# Patient Record
Sex: MALE | Race: WHITE | NOT HISPANIC OR LATINO | Employment: FULL TIME | URBAN - METROPOLITAN AREA
[De-identification: names, ages, dates, MRNs, and addresses within clinical notes are randomized per-mention and may not be internally consistent; named-entity substitution may affect disease eponyms.]

---

## 2017-12-05 ENCOUNTER — APPOINTMENT (OUTPATIENT)
Dept: GENERAL RADIOLOGY | Facility: HOSPITAL | Age: 42
End: 2017-12-05

## 2017-12-05 ENCOUNTER — HOSPITAL ENCOUNTER (EMERGENCY)
Facility: HOSPITAL | Age: 42
Discharge: HOME | End: 2017-12-05
Attending: EMERGENCY MEDICINE | Admitting: EMERGENCY MEDICINE

## 2017-12-05 VITALS
HEIGHT: 70 IN | TEMPERATURE: 98.2 F | DIASTOLIC BLOOD PRESSURE: 98 MMHG | RESPIRATION RATE: 20 BRPM | WEIGHT: 315 LBS | HEART RATE: 82 BPM | BODY MASS INDEX: 45.1 KG/M2 | SYSTOLIC BLOOD PRESSURE: 174 MMHG | OXYGEN SATURATION: 95 %

## 2017-12-05 DIAGNOSIS — M70.22 OLECRANON BURSITIS OF LEFT ELBOW: Primary | ICD-10-CM

## 2017-12-05 PROCEDURE — 73080 X-RAY EXAM OF ELBOW: CPT

## 2017-12-05 PROCEDURE — 99282 EMERGENCY DEPT VISIT SF MDM: CPT | Performed by: EMERGENCY MEDICINE

## 2017-12-05 PROCEDURE — 99283 EMERGENCY DEPT VISIT LOW MDM: CPT

## 2017-12-05 RX ORDER — CEPHALEXIN 500 MG/1
500 CAPSULE ORAL 4 TIMES DAILY
Qty: 40 CAPSULE | Refills: 0 | Status: SHIPPED | OUTPATIENT
Start: 2017-12-05 | End: 2017-12-15

## 2017-12-05 RX ORDER — DICLOFENAC SODIUM 75 MG/1
75 TABLET, DELAYED RELEASE ORAL 2 TIMES DAILY PRN
Qty: 20 TABLET | Refills: 0 | Status: SHIPPED | OUTPATIENT
Start: 2017-12-05 | End: 2019-08-28

## 2017-12-05 RX ORDER — CEPHALEXIN 500 MG/1
500 CAPSULE ORAL ONCE
Status: COMPLETED | OUTPATIENT
Start: 2017-12-05 | End: 2017-12-05

## 2017-12-05 RX ORDER — IBUPROFEN 400 MG/1
800 TABLET ORAL ONCE
Status: COMPLETED | OUTPATIENT
Start: 2017-12-05 | End: 2017-12-05

## 2017-12-05 RX ADMIN — CEPHALEXIN 500 MG: 500 CAPSULE ORAL at 21:32

## 2017-12-05 RX ADMIN — IBUPROFEN 800 MG: 400 TABLET ORAL at 21:32

## 2017-12-06 NOTE — ED PROVIDER NOTES
Subjective     History provided by:  Patient    History of Present Illness    · Chief complaint: Left elbow pain and swelling    · Location: The pain is primarily on the dorsal aspect of the elbow, but he reports some discomfort in his entire elbow.    · Quality/Severity: Swelling and pain of the left elbow.    · Timing/Onset: The patient woke with these symptoms yesterday morning.    · Modifying Factors: The patient is unable to fully extend his left elbow due to pain.    · Associated symptoms: He denies any swelling or pain in his left arm or wrist or left shoulder.    · Narrative: The patient is a 6-year-old white male who awoke yesterday morning with swelling and pain in his left elbow.  He denies any history of trauma.  He is unable to fully extend his left elbow due to pain.  His past medical history significant for diabetes and hypertension.  Social history the patient owns a auto parts store, doesn't smoke or use alcohol.    ED Triage Vitals   Temp Heart Rate Resp BP SpO2   12/05/17 2022 12/05/17 2022 12/05/17 2022 12/05/17 2022 12/05/17 2022   98.2 °F (36.8 °C) 82 20 174/98 95 %      Temp src Heart Rate Source Patient Position BP Location FiO2 (%)   12/05/17 2022 12/05/17 2022 12/05/17 2022 12/05/17 2022 --   Oral Monitor Lying Left arm        Review of Systems   Constitutional: Negative for activity change, chills and fever.   HENT: Negative for trouble swallowing and voice change.    Eyes: Negative for visual disturbance.   Respiratory: Negative for cough and shortness of breath.    Cardiovascular: Negative for chest pain.   Musculoskeletal: Positive for joint swelling (left elbow only). Negative for back pain, myalgias, neck pain and neck stiffness.   Skin: Negative for color change and rash.   Neurological: Negative for weakness, numbness and headaches.   Hematological: Negative for adenopathy. Does not bruise/bleed easily.   Psychiatric/Behavioral: Negative for sleep disturbance.       Past Medical  History:   Diagnosis Date   • Diabetes mellitus    • Hyperlipidemia    • Hypertension        Allergies   Allergen Reactions   • Sulfa Antibiotics        History reviewed. No pertinent surgical history.    History reviewed. No pertinent family history.    Social History     Social History   • Marital status:      Spouse name: N/A   • Number of children: N/A   • Years of education: N/A     Social History Main Topics   • Smoking status: Never Smoker   • Smokeless tobacco: None   • Alcohol use No   • Drug use: No   • Sexual activity: Not Asked     Other Topics Concern   • None     Social History Narrative   • None           Objective   Physical Exam   Constitutional: He is oriented to person, place, and time. He appears well-developed and well-nourished.   HENT:   Head: Normocephalic and atraumatic.   Musculoskeletal:   The left elbow has tenderness in a faint amount erythema over the olecranon area of the dorsal elbow consistent with olecranon bursitis.  There is not a lot of swelling in that area.  He is unable to extend his elbow fully due to pain in his olecranon area of his elbow.  He has a mild amount of pain with pronation.  The left forearm, wrist, hand is nontender without swelling in his neurovascular intact.   Neurological: He is alert and oriented to person, place, and time. No cranial nerve deficit.   No focal motor sensory deficit   Skin: Skin is warm and dry. No rash noted. No erythema.   Psychiatric: He has a normal mood and affect. His behavior is normal. Judgment and thought content normal.   Nursing note and vitals reviewed.      Procedures         ED Course  ED Course   Comment By Time   Dino Report [id number] is blank Alexey Johnson MD 12/05 2102   The patient's x-ray has no bony normality use.  On exam the patient is tender has a mild amount erythema in the olecranon area consistent with an olecranon bursitis.  He'll be placed in a sling and prescribed anti-inflammatories.  I instructed to  follow-up with Dr. Sorto, or Dr. Bragg this week.  To cover the possibility of infection he'll also be given Keflex. Alexey Johnson MD 12/05 2125   Left arm was placed in a sling by tech.  Left hand was neurovascularly intact after placement. Alexey Johnson MD 12/05 2213                  MDM  Number of Diagnoses or Management Options  Olecranon bursitis of left elbow: new and requires workup     Amount and/or Complexity of Data Reviewed  Tests in the radiology section of CPT®: ordered and reviewed  Independent visualization of images, tracings, or specimens: yes    Risk of Complications, Morbidity, and/or Mortality  Presenting problems: low  Diagnostic procedures: low  Management options: low    Patient Progress  Patient progress: stable      Final diagnoses:   Olecranon bursitis of left elbow           Labs Reviewed - No data to display  XR Elbow 3+ View Left   ED Interpretation   No bony abnormality.  Mild soft tissue swelling.  No tissue sinus   consistent with an effusion.             Medication List      New Prescriptions          cephalexin 500 MG capsule   Commonly known as:  KEFLEX   Take 1 capsule by mouth 4 (Four) Times a Day for 10 days.       diclofenac 75 MG EC tablet   Commonly known as:  VOLTAREN   Take 1 tablet by mouth 2 (Two) Times a Day As Needed (Pain) for up to 20   doses.                Alexey Johnson MD  12/05/17 1401

## 2019-08-28 ENCOUNTER — OFFICE VISIT (OUTPATIENT)
Dept: SURGERY | Facility: CLINIC | Age: 44
End: 2019-08-28

## 2019-08-28 VITALS
WEIGHT: 315 LBS | DIASTOLIC BLOOD PRESSURE: 90 MMHG | HEART RATE: 80 BPM | SYSTOLIC BLOOD PRESSURE: 122 MMHG | BODY MASS INDEX: 45.1 KG/M2 | RESPIRATION RATE: 20 BRPM | HEIGHT: 70 IN

## 2019-08-28 DIAGNOSIS — D50.9 IRON DEFICIENCY ANEMIA, UNSPECIFIED IRON DEFICIENCY ANEMIA TYPE: Primary | ICD-10-CM

## 2019-08-28 PROCEDURE — 99203 OFFICE O/P NEW LOW 30 MIN: CPT | Performed by: SURGERY

## 2019-08-28 RX ORDER — GLIPIZIDE 10 MG/1
10 TABLET ORAL
COMMUNITY

## 2019-08-28 RX ORDER — ERGOCALCIFEROL 1.25 MG/1
50000 CAPSULE ORAL WEEKLY
COMMUNITY
End: 2021-03-22

## 2019-08-28 NOTE — PROGRESS NOTES
Haris JUD Hansen 44 y.o. male presents @ the req of [facility] for eval of anemia. Pt reports no previous c-scope or egd.   Chief Complaint   Patient presents with   • ANEMIA             HPI   Above-noted and agree.  This very pleasant 44-year-old male has been diagnosed with iron deficiency anemia.  He has no abdominal pain.  He tolerates a regular diet without nausea or vomiting.  He has self diagnosed him with a colitis problem.  Every so often he will have rectal bleeding for several days.  He also says the area burns like fire.  This will usually resolve after treatment with hemorrhoid cream.  This has not happened for the last several months.  He was originally set up for endoscopy elsewhere but then the procedure was canceled the day of the procedure and that made him upset.  He has no family history of colon cancer.  He has no abdominal pain.  He has no fevers or chills.  He has no other complaints.      Review of Systems   All other systems reviewed and are negative.            Current Outpatient Medications:   •  esomeprazole (nexIUM) 20 MG capsule, Take 20 mg by mouth Every Morning Before Breakfast., Disp: , Rfl:   •  Ferrous Sulfate Dried (FERROUS SULFATE CR PO), Take  by mouth., Disp: , Rfl:   •  glipiZIDE (GLUCOTROL) 10 MG tablet, Take 10 mg by mouth 2 (Two) Times a Day Before Meals., Disp: , Rfl:   •  vitamin D (ERGOCALCIFEROL) 54887 units capsule capsule, Take 50,000 Units by mouth 1 (One) Time Per Week., Disp: , Rfl:   •  FENOFIBRATE PO, Take  by mouth., Disp: , Rfl:   •  Insulin Degludec (TRESIBA FLEXTOUCH SC), Inject  under the skin., Disp: , Rfl:   •  LOSARTAN POTASSIUM PO, Take  by mouth., Disp: , Rfl:   •  metFORMIN (GLUCOPHAGE) 1000 MG tablet, Take 1,000 mg by mouth 2 (Two) Times a Day With Meals., Disp: , Rfl:   •  METOPROLOL SUCCINATE ER PO, Take  by mouth., Disp: , Rfl:   •  SIMVASTATIN PO, Take  by mouth., Disp: , Rfl:         Allergies   Allergen Reactions   • Sulfa Antibiotics   "          Past Medical History:   Diagnosis Date   • Diabetes mellitus (CMS/HCC)    • Hyperlipidemia    • Hypertension    • Sleep apnea            Past Surgical History:   Procedure Laterality Date   • GANGLION CYST EXCISION     • UMBILICAL HERNIA REPAIR             Social History     Tobacco Use   • Smoking status: Never Smoker   • Smokeless tobacco: Never Used   Substance Use Topics   • Alcohol use: No   • Drug use: No             There is no immunization history on file for this patient.        Physical Exam   Constitutional: He is oriented to person, place, and time. He appears well-developed and well-nourished.   HENT:   Head: Normocephalic and atraumatic.   Right Ear: External ear normal.   Left Ear: External ear normal.   Cardiovascular: Normal rate and regular rhythm.   Pulmonary/Chest: Effort normal and breath sounds normal.   Abdominal: Soft. Bowel sounds are normal.   Musculoskeletal: He exhibits no edema or deformity.   Neurological: He is alert and oriented to person, place, and time.   Skin: Skin is warm and dry.   Psychiatric: He has a normal mood and affect. His behavior is normal.   Nursing note and vitals reviewed.      Debilities/Disabilities Identified: None    Emotional Behavior: Appropriate      /90   Pulse 80   Resp 20   Ht 177.8 cm (70\")   Wt (!) 170 kg (374 lb)   BMI 53.66 kg/m²         Haris was seen today for anemia.    Diagnoses and all orders for this visit:    Iron deficiency anemia, unspecified iron deficiency anemia type    We will get Haris scheduled for an EGD and colonoscopy.  I discussed with the patient the benefits and risks of performing endoscopy.  Benefits and risks were not limited to but including bleeding, infection, perforation, complications of anesthesia, aspiration.  The patient appeared to understand and is willing to proceed.    Thank you for allowing me to participate in the care of this interesting patient.            "

## 2019-08-28 NOTE — H&P
Haris JUD Hansen 44 y.o. male presents @ the req of [facility] for eval of anemia. Pt reports no previous c-scope or egd.   Chief Complaint   Patient presents with   • ANEMIA             HPI   Above-noted and agree.  This very pleasant 44-year-old male has been diagnosed with iron deficiency anemia.  He has no abdominal pain.  He tolerates a regular diet without nausea or vomiting.  He has self diagnosed him with a colitis problem.  Every so often he will have rectal bleeding for several days.  He also says the area burns like fire.  This will usually resolve after treatment with hemorrhoid cream.  This has not happened for the last several months.  He was originally set up for endoscopy elsewhere but then the procedure was canceled the day of the procedure and that made him upset.  He has no family history of colon cancer.  He has no abdominal pain.  He has no fevers or chills.  He has no other complaints.      Review of Systems   All other systems reviewed and are negative.            Current Outpatient Medications:   •  esomeprazole (nexIUM) 20 MG capsule, Take 20 mg by mouth Every Morning Before Breakfast., Disp: , Rfl:   •  Ferrous Sulfate Dried (FERROUS SULFATE CR PO), Take  by mouth., Disp: , Rfl:   •  glipiZIDE (GLUCOTROL) 10 MG tablet, Take 10 mg by mouth 2 (Two) Times a Day Before Meals., Disp: , Rfl:   •  vitamin D (ERGOCALCIFEROL) 80005 units capsule capsule, Take 50,000 Units by mouth 1 (One) Time Per Week., Disp: , Rfl:   •  FENOFIBRATE PO, Take  by mouth., Disp: , Rfl:   •  Insulin Degludec (TRESIBA FLEXTOUCH SC), Inject  under the skin., Disp: , Rfl:   •  LOSARTAN POTASSIUM PO, Take  by mouth., Disp: , Rfl:   •  metFORMIN (GLUCOPHAGE) 1000 MG tablet, Take 1,000 mg by mouth 2 (Two) Times a Day With Meals., Disp: , Rfl:   •  METOPROLOL SUCCINATE ER PO, Take  by mouth., Disp: , Rfl:   •  SIMVASTATIN PO, Take  by mouth., Disp: , Rfl:         Allergies   Allergen Reactions   • Sulfa Antibiotics   "          Past Medical History:   Diagnosis Date   • Diabetes mellitus (CMS/HCC)    • Hyperlipidemia    • Hypertension    • Sleep apnea            Past Surgical History:   Procedure Laterality Date   • GANGLION CYST EXCISION     • UMBILICAL HERNIA REPAIR             Social History     Tobacco Use   • Smoking status: Never Smoker   • Smokeless tobacco: Never Used   Substance Use Topics   • Alcohol use: No   • Drug use: No             There is no immunization history on file for this patient.        Physical Exam   Constitutional: He is oriented to person, place, and time. He appears well-developed and well-nourished.   HENT:   Head: Normocephalic and atraumatic.   Right Ear: External ear normal.   Left Ear: External ear normal.   Cardiovascular: Normal rate and regular rhythm.   Pulmonary/Chest: Effort normal and breath sounds normal.   Abdominal: Soft. Bowel sounds are normal.   Musculoskeletal: He exhibits no edema or deformity.   Neurological: He is alert and oriented to person, place, and time.   Skin: Skin is warm and dry.   Psychiatric: He has a normal mood and affect. His behavior is normal.   Nursing note and vitals reviewed.      Debilities/Disabilities Identified: None    Emotional Behavior: Appropriate      /90   Pulse 80   Resp 20   Ht 177.8 cm (70\")   Wt (!) 170 kg (374 lb)   BMI 53.66 kg/m²          Haris was seen today for anemia.    Diagnoses and all orders for this visit:    Iron deficiency anemia, unspecified iron deficiency anemia type    We will get Haris scheduled for an EGD and colonoscopy.  I discussed with the patient the benefits and risks of performing endoscopy.  Benefits and risks were not limited to but including bleeding, infection, perforation, complications of anesthesia, aspiration.  The patient appeared to understand and is willing to proceed.    Thank you for allowing me to participate in the care of this interesting patient.              "

## 2019-09-16 ENCOUNTER — ANESTHESIA EVENT (OUTPATIENT)
Dept: PERIOP | Facility: HOSPITAL | Age: 44
End: 2019-09-16

## 2019-09-17 ENCOUNTER — HOSPITAL ENCOUNTER (OUTPATIENT)
Facility: HOSPITAL | Age: 44
Setting detail: HOSPITAL OUTPATIENT SURGERY
Discharge: HOME | End: 2019-09-17
Attending: SURGERY | Admitting: SURGERY

## 2019-09-17 ENCOUNTER — ANESTHESIA (OUTPATIENT)
Dept: PERIOP | Facility: HOSPITAL | Age: 44
End: 2019-09-17

## 2019-09-17 VITALS
DIASTOLIC BLOOD PRESSURE: 77 MMHG | HEIGHT: 70 IN | WEIGHT: 315 LBS | BODY MASS INDEX: 45.1 KG/M2 | RESPIRATION RATE: 18 BRPM | TEMPERATURE: 98 F | HEART RATE: 68 BPM | OXYGEN SATURATION: 92 % | SYSTOLIC BLOOD PRESSURE: 131 MMHG

## 2019-09-17 DIAGNOSIS — D50.9 IRON DEFICIENCY ANEMIA, UNSPECIFIED IRON DEFICIENCY ANEMIA TYPE: ICD-10-CM

## 2019-09-17 LAB — GLUCOSE BLDC GLUCOMTR-MCNC: 138 MG/DL (ref 70–130)

## 2019-09-17 PROCEDURE — 82962 GLUCOSE BLOOD TEST: CPT

## 2019-09-17 PROCEDURE — 45385 COLONOSCOPY W/LESION REMOVAL: CPT | Performed by: SURGERY

## 2019-09-17 PROCEDURE — 88305 TISSUE EXAM BY PATHOLOGIST: CPT | Performed by: SURGERY

## 2019-09-17 PROCEDURE — 93010 ELECTROCARDIOGRAM REPORT: CPT | Performed by: INTERNAL MEDICINE

## 2019-09-17 PROCEDURE — 93005 ELECTROCARDIOGRAM TRACING: CPT | Performed by: NURSE ANESTHETIST, CERTIFIED REGISTERED

## 2019-09-17 PROCEDURE — 87081 CULTURE SCREEN ONLY: CPT | Performed by: SURGERY

## 2019-09-17 PROCEDURE — 25010000002 PROPOFOL 10 MG/ML EMULSION: Performed by: NURSE ANESTHETIST, CERTIFIED REGISTERED

## 2019-09-17 PROCEDURE — 45380 COLONOSCOPY AND BIOPSY: CPT | Performed by: SURGERY

## 2019-09-17 PROCEDURE — 43239 EGD BIOPSY SINGLE/MULTIPLE: CPT | Performed by: SURGERY

## 2019-09-17 RX ORDER — GLYCOPYRROLATE 0.2 MG/ML
INJECTION INTRAMUSCULAR; INTRAVENOUS AS NEEDED
Status: DISCONTINUED | OUTPATIENT
Start: 2019-09-17 | End: 2019-09-17 | Stop reason: SURG

## 2019-09-17 RX ORDER — SODIUM CHLORIDE, SODIUM LACTATE, POTASSIUM CHLORIDE, CALCIUM CHLORIDE 600; 310; 30; 20 MG/100ML; MG/100ML; MG/100ML; MG/100ML
9 INJECTION, SOLUTION INTRAVENOUS CONTINUOUS PRN
Status: DISCONTINUED | OUTPATIENT
Start: 2019-09-17 | End: 2019-09-17 | Stop reason: HOSPADM

## 2019-09-17 RX ORDER — SODIUM CHLORIDE 0.9 % (FLUSH) 0.9 %
1-10 SYRINGE (ML) INJECTION AS NEEDED
Status: DISCONTINUED | OUTPATIENT
Start: 2019-09-17 | End: 2019-09-17 | Stop reason: HOSPADM

## 2019-09-17 RX ORDER — SODIUM CHLORIDE, SODIUM LACTATE, POTASSIUM CHLORIDE, CALCIUM CHLORIDE 600; 310; 30; 20 MG/100ML; MG/100ML; MG/100ML; MG/100ML
100 INJECTION, SOLUTION INTRAVENOUS CONTINUOUS
Status: CANCELLED | OUTPATIENT
Start: 2019-09-17

## 2019-09-17 RX ORDER — PROPOFOL 10 MG/ML
VIAL (ML) INTRAVENOUS AS NEEDED
Status: DISCONTINUED | OUTPATIENT
Start: 2019-09-17 | End: 2019-09-17 | Stop reason: SURG

## 2019-09-17 RX ORDER — LIDOCAINE HYDROCHLORIDE 20 MG/ML
INJECTION, SOLUTION INFILTRATION; PERINEURAL AS NEEDED
Status: DISCONTINUED | OUTPATIENT
Start: 2019-09-17 | End: 2019-09-17 | Stop reason: SURG

## 2019-09-17 RX ORDER — MAGNESIUM HYDROXIDE 1200 MG/15ML
LIQUID ORAL AS NEEDED
Status: DISCONTINUED | OUTPATIENT
Start: 2019-09-17 | End: 2019-09-17 | Stop reason: HOSPADM

## 2019-09-17 RX ORDER — SODIUM CHLORIDE 0.9 % (FLUSH) 0.9 %
3 SYRINGE (ML) INJECTION EVERY 12 HOURS SCHEDULED
Status: DISCONTINUED | OUTPATIENT
Start: 2019-09-17 | End: 2019-09-17 | Stop reason: HOSPADM

## 2019-09-17 RX ORDER — LIDOCAINE HYDROCHLORIDE 10 MG/ML
0.5 INJECTION, SOLUTION EPIDURAL; INFILTRATION; INTRACAUDAL; PERINEURAL ONCE AS NEEDED
Status: COMPLETED | OUTPATIENT
Start: 2019-09-17 | End: 2019-09-17

## 2019-09-17 RX ORDER — ONDANSETRON 2 MG/ML
4 INJECTION INTRAMUSCULAR; INTRAVENOUS ONCE AS NEEDED
Status: CANCELLED | OUTPATIENT
Start: 2019-09-17

## 2019-09-17 RX ORDER — SODIUM CHLORIDE 9 MG/ML
40 INJECTION, SOLUTION INTRAVENOUS AS NEEDED
Status: DISCONTINUED | OUTPATIENT
Start: 2019-09-17 | End: 2019-09-17 | Stop reason: HOSPADM

## 2019-09-17 RX ADMIN — LIDOCAINE HYDROCHLORIDE 0.2 ML: 10 INJECTION, SOLUTION EPIDURAL; INFILTRATION; INTRACAUDAL; PERINEURAL at 09:45

## 2019-09-17 RX ADMIN — PROPOFOL 30 MG: 10 INJECTION, EMULSION INTRAVENOUS at 11:14

## 2019-09-17 RX ADMIN — PROPOFOL 50 MG: 10 INJECTION, EMULSION INTRAVENOUS at 10:56

## 2019-09-17 RX ADMIN — PROPOFOL 50 MG: 10 INJECTION, EMULSION INTRAVENOUS at 10:39

## 2019-09-17 RX ADMIN — SODIUM CHLORIDE, POTASSIUM CHLORIDE, SODIUM LACTATE AND CALCIUM CHLORIDE 9 ML/HR: 600; 310; 30; 20 INJECTION, SOLUTION INTRAVENOUS at 09:45

## 2019-09-17 RX ADMIN — PROPOFOL 40 MG: 10 INJECTION, EMULSION INTRAVENOUS at 10:32

## 2019-09-17 RX ADMIN — LIDOCAINE HYDROCHLORIDE 100 MG: 20 INJECTION, SOLUTION INFILTRATION; PERINEURAL at 10:31

## 2019-09-17 RX ADMIN — PROPOFOL 120 MG: 10 INJECTION, EMULSION INTRAVENOUS at 10:31

## 2019-09-17 RX ADMIN — PROPOFOL 50 MG: 10 INJECTION, EMULSION INTRAVENOUS at 11:11

## 2019-09-17 RX ADMIN — PROPOFOL 30 MG: 10 INJECTION, EMULSION INTRAVENOUS at 11:23

## 2019-09-17 RX ADMIN — PROPOFOL 50 MG: 10 INJECTION, EMULSION INTRAVENOUS at 11:19

## 2019-09-17 RX ADMIN — PROPOFOL 30 MG: 10 INJECTION, EMULSION INTRAVENOUS at 11:27

## 2019-09-17 RX ADMIN — PROPOFOL 30 MG: 10 INJECTION, EMULSION INTRAVENOUS at 11:00

## 2019-09-17 RX ADMIN — PROPOFOL 50 MG: 10 INJECTION, EMULSION INTRAVENOUS at 11:03

## 2019-09-17 RX ADMIN — PROPOFOL 60 MG: 10 INJECTION, EMULSION INTRAVENOUS at 10:34

## 2019-09-17 RX ADMIN — PROPOFOL 40 MG: 10 INJECTION, EMULSION INTRAVENOUS at 10:35

## 2019-09-17 RX ADMIN — GLYCOPYRROLATE 0.15 MG: 0.2 INJECTION INTRAMUSCULAR; INTRAVENOUS at 10:15

## 2019-09-17 RX ADMIN — PROPOFOL 50 MG: 10 INJECTION, EMULSION INTRAVENOUS at 10:48

## 2019-09-17 RX ADMIN — PROPOFOL 30 MG: 10 INJECTION, EMULSION INTRAVENOUS at 11:07

## 2019-09-17 NOTE — INTERVAL H&P NOTE
"  H&P reviewed. The patient was examined and there are no changes to the H&P.       BP (!) 201/125 (BP Location: Left arm, Patient Position: Lying)   Pulse 79   Temp 98.3 °F (36.8 °C) (Oral)   Resp 20   Ht 177.8 cm (70\")   Wt (!) 169 kg (371 lb 9.6 oz)   SpO2 92%   BMI 53.32 kg/m²         "

## 2019-09-17 NOTE — ANESTHESIA POSTPROCEDURE EVALUATION
Patient: Haris Hansen    Procedure Summary     Date:  09/17/19 Room / Location:  [facility] ENDOSCOPY 1 /  [facility]    Anesthesia Start:  1026 Anesthesia Stop:  1139    Procedures:       Esophagogastroduodenoscoy, biopsy  ,polypectomy (N/A Esophagus)      Colonoscopy,polypectomy (N/A ) Diagnosis:       Iron deficiency anemia, unspecified iron deficiency anemia type      (Iron deficiency anemia, unspecified iron deficiency anemia type [D50.9])    Surgeon:  Deborah Gamez DO Provider:  Judy Henriquez CRNA    Anesthesia Type:  MAC ASA Status:  3          Anesthesia Type: MAC  Last vitals  BP   125/82 (09/17/19 1147)   Temp   98 °F (36.7 °C) (09/17/19 1147)   Pulse   80 (09/17/19 1147)   Resp   18 (09/17/19 1147)     SpO2   94 % (09/17/19 1147)     Post Anesthesia Care and Evaluation    Patient location during evaluation: PHASE II  Patient participation: complete - patient participated  Level of consciousness: awake and alert  Pain score: 0  Pain management: adequate  Airway patency: patent  Anesthetic complications: No anesthetic complications  PONV Status: none  Cardiovascular status: acceptable  Respiratory status: acceptable  Hydration status: acceptable

## 2019-09-17 NOTE — ANESTHESIA PREPROCEDURE EVALUATION
Anesthesia Evaluation     Patient summary reviewed and Nursing notes reviewed   no history of anesthetic complications:  NPO Solid Status: > 8 hours  NPO Liquid Status: > 8 hours           Airway   Mallampati: II  TM distance: >3 FB  Possible difficult intubation and Large neck circumference  Comment: Some ROM issues in past, has good ROM today  Dental      Pulmonary - normal exam    breath sounds clear to auscultation  (+) sleep apnea (noncompliant w/ cpap),   Cardiovascular - normal exam  Exercise tolerance: good (4-7 METS)    Patient on routine beta blocker  Rhythm: regular  Rate: normal    (+) hypertension, hyperlipidemia,     ROS comment: Pt last took BP meds 9/16/19  Currently 205/114    Neuro/Psych  GI/Hepatic/Renal/Endo    (+) morbid obesity, GERD (off meds currently),  diabetes mellitus (accu check 138) type 2 using insulin,     Musculoskeletal     (+) arthralgias, neck pain, neck stiffness,       ROS comment: Hx of C4 - 5  fx (1996) limited ROM  Abdominal   (+) obese,    Substance History - negative use     OB/GYN          Other - negative ROS                       Anesthesia Plan    ASA 3     MAC     Anesthetic plan, all risks, benefits, and alternatives have been provided, discussed and informed consent has been obtained with: patient.  Use of blood products discussed with patient  Consented to blood products.

## 2019-09-17 NOTE — OP NOTE
EGD and Colonoscopy Procedure Note      Pre-operative Diagnosis: Iron deficiency anemia    Post-operative Diagnosis: Gastric polyps, polyps of the sigmoid colon and rectum    Procedure:  EGD with biopsies with cold forceps and  Colonoscopy polypectomy with hot snare and cold forceps    Surgeon: Vera    Anesthetic: MAC     Estimated Blood Loss: Minimal    Complications: None    Indications: Iron deficiency anemia    Findings/Treatments:   Gastric polyps-biopsied with cold forceps and CLOtest with cold forceps  Sigmoid colon polyps-removed with cold forceps  Rectal polyps-removed with hot snare and cold forceps       Scope Withdrawal Time:  > 6 minutes      Recommendations: Await pathology      Procedure Details     After discussing the benefits and risks of an EGD and Colonoscopy, benefits and risks not limited to but including:  Bleeding, infection, perforation, aspiration; informed consent was signed.  The patient was taken into the endoscopy suite at [facility] and placed in the left lateral decubitus position.  MAC anesthesia was induced under appropriate monitoring.  Bite block was placed and the gastroscope was inserted thru such and advanced under direct vision to second portion of the duodenum.  A careful inspection was made as the gastroscope was withdrawn, including a retroflexed view of the proximal stomach; findings and interventions are described below.  If biopsies were taken, this was done with the cold biopsy forceps.    The bed was then rotated 180 degrees.  A rectal exam was performed.  Sphincter tone was normal.  The colonoscope was then inserted and carefully advanced to the cecum while visualizing the mucosa.  The cecum was identified by the ileocecal valve and the orifice of the appendix.  Once in the cecum the scope was slowly withdrawn while carefully evaluated mucosa deflating air.  There were 2 polyps noted in the sigmoid colon that removed cold forceps.  There  were 5 polyps in the rectum.  One was removed with hot snare the remaining removed cold forceps.  Once all the polyps removed the scope was withdrawn and Haris was taken to recovery area in stable postoperative condition having tolerated procedure well.    Deborah Gamez DO

## 2019-09-18 LAB
CYTO UR: NORMAL
LAB AP CASE REPORT: NORMAL
PATH REPORT.FINAL DX SPEC: NORMAL
PATH REPORT.GROSS SPEC: NORMAL
UREASE TISS QL: NEGATIVE

## 2019-09-25 ENCOUNTER — OFFICE VISIT (OUTPATIENT)
Dept: SURGERY | Facility: CLINIC | Age: 44
End: 2019-09-25

## 2019-09-25 DIAGNOSIS — K63.5 POLYP OF COLON, UNSPECIFIED PART OF COLON, UNSPECIFIED TYPE: Primary | ICD-10-CM

## 2019-09-25 PROCEDURE — 99213 OFFICE O/P EST LOW 20 MIN: CPT | Performed by: SURGERY

## 2019-09-25 NOTE — PROGRESS NOTES
Haris Domínguezrip 44 y.o. male presents for PO FU EGD/C-SCOPE.  Pt feels well.        HPI     Haris had gastric polyps and polyps of the colon and rectum.  He said he feels much better.  His symptoms have resolved.  He does not use tobacco.  He has no pain or complaints.    Review of Systems        Past Medical History:   Diagnosis Date   • Diabetes mellitus (CMS/HCC)    • Hyperlipidemia    • Hypertension    • Sleep apnea            Past Surgical History:   Procedure Laterality Date   • COLONOSCOPY N/A 9/17/2019    Procedure: Colonoscopy,polypectomy;  Surgeon: Deborah Gamez DO;  Location: [facility] OR;  Service: Gastroenterology   • ENDOSCOPY N/A 9/17/2019    Procedure: Esophagogastroduodenoscoy, biopsy  ,polypectomy;  Surgeon: Deborah Gamez DO;  Location: [facility] OR;  Service: Gastroenterology   • GANGLION CYST EXCISION     • UMBILICAL HERNIA REPAIR             Physical Exam   Constitutional: He is oriented to person, place, and time. He appears well-developed and well-nourished.   HENT:   Head: Normocephalic and atraumatic.   Right Ear: External ear normal.   Left Ear: External ear normal.   Musculoskeletal: He exhibits no edema or deformity.   Neurological: He is alert and oriented to person, place, and time.   Skin: Skin is warm and dry.   Psychiatric: He has a normal mood and affect. His behavior is normal.           There were no vitals taken for this visit.        Haris was seen today for post-op follow-up.    Diagnoses and all orders for this visit:    Polyp of colon, unspecified part of colon, unspecified type    Haris will need a three year repeat colonoscopy.  We discussed dietary changes to help decrease his colon cancer risks.    Thank you for allowing me to participate in the care of this interesting patient.

## 2019-11-10 ENCOUNTER — APPOINTMENT (OUTPATIENT)
Dept: GENERAL RADIOLOGY | Facility: HOSPITAL | Age: 44
End: 2019-11-10

## 2019-11-10 ENCOUNTER — HOSPITAL ENCOUNTER (OUTPATIENT)
Facility: HOSPITAL | Age: 44
Setting detail: OBSERVATION
Discharge: HOME | End: 2019-11-11
Attending: EMERGENCY MEDICINE | Admitting: HOSPITALIST

## 2019-11-10 DIAGNOSIS — R07.9 CHEST PAIN WITH HIGH RISK FOR CARDIAC ETIOLOGY: Primary | ICD-10-CM

## 2019-11-10 LAB
ALBUMIN SERPL-MCNC: 3.8 G/DL (ref 3.5–5.2)
ALBUMIN/GLOB SERPL: 1.3 G/DL
ALP SERPL-CCNC: 78 U/L (ref 39–117)
ALT SERPL W P-5'-P-CCNC: 26 U/L (ref 1–41)
ANION GAP SERPL CALCULATED.3IONS-SCNC: 12.9 MMOL/L (ref 5–15)
AST SERPL-CCNC: 17 U/L (ref 1–40)
BASOPHILS # BLD AUTO: 0.06 10*3/MM3 (ref 0–0.2)
BASOPHILS NFR BLD AUTO: 0.6 % (ref 0–1.5)
BILIRUB SERPL-MCNC: 0.2 MG/DL (ref 0.2–1.2)
BUN BLD-MCNC: 14 MG/DL (ref 6–20)
BUN/CREAT SERPL: 18.2 (ref 7–25)
CALCIUM SPEC-SCNC: 9 MG/DL (ref 8.6–10.5)
CHLORIDE SERPL-SCNC: 103 MMOL/L (ref 98–107)
CO2 SERPL-SCNC: 22.1 MMOL/L (ref 22–29)
CREAT BLD-MCNC: 0.77 MG/DL (ref 0.76–1.27)
D DIMER PPP FEU-MCNC: <0.27 MCGFEU/ML (ref 0–0.46)
DEPRECATED RDW RBC AUTO: 42.7 FL (ref 37–54)
EOSINOPHIL # BLD AUTO: 0.32 10*3/MM3 (ref 0–0.4)
EOSINOPHIL NFR BLD AUTO: 3.1 % (ref 0.3–6.2)
ERYTHROCYTE [DISTWIDTH] IN BLOOD BY AUTOMATED COUNT: 14.6 % (ref 12.3–15.4)
GFR SERPL CREATININE-BSD FRML MDRD: 111 ML/MIN/1.73
GLOBULIN UR ELPH-MCNC: 3 GM/DL
GLUCOSE BLD-MCNC: 174 MG/DL (ref 65–99)
GLUCOSE BLDC GLUCOMTR-MCNC: 120 MG/DL (ref 70–130)
HCT VFR BLD AUTO: 46.1 % (ref 37.5–51)
HGB BLD-MCNC: 14.6 G/DL (ref 13–17.7)
IMM GRANULOCYTES # BLD AUTO: 0.05 10*3/MM3 (ref 0–0.05)
IMM GRANULOCYTES NFR BLD AUTO: 0.5 % (ref 0–0.5)
LYMPHOCYTES # BLD AUTO: 2.5 10*3/MM3 (ref 0.7–3.1)
LYMPHOCYTES NFR BLD AUTO: 24.4 % (ref 19.6–45.3)
MCH RBC QN AUTO: 25.6 PG (ref 26.6–33)
MCHC RBC AUTO-ENTMCNC: 31.7 G/DL (ref 31.5–35.7)
MCV RBC AUTO: 80.9 FL (ref 79–97)
MONOCYTES # BLD AUTO: 0.77 10*3/MM3 (ref 0.1–0.9)
MONOCYTES NFR BLD AUTO: 7.5 % (ref 5–12)
NEUTROPHILS # BLD AUTO: 6.54 10*3/MM3 (ref 1.7–7)
NEUTROPHILS NFR BLD AUTO: 63.9 % (ref 42.7–76)
NRBC BLD AUTO-RTO: 0 /100 WBC (ref 0–0.2)
NT-PROBNP SERPL-MCNC: 14.3 PG/ML (ref 5–450)
PLATELET # BLD AUTO: 275 10*3/MM3 (ref 140–450)
PMV BLD AUTO: 10.4 FL (ref 6–12)
POTASSIUM BLD-SCNC: 4.2 MMOL/L (ref 3.5–5.2)
PROT SERPL-MCNC: 6.8 G/DL (ref 6–8.5)
RBC # BLD AUTO: 5.7 10*6/MM3 (ref 4.14–5.8)
SODIUM BLD-SCNC: 138 MMOL/L (ref 136–145)
TROPONIN T SERPL-MCNC: <0.01 NG/ML (ref 0–0.03)
TROPONIN T SERPL-MCNC: <0.01 NG/ML (ref 0–0.03)
WBC NRBC COR # BLD: 10.24 10*3/MM3 (ref 3.4–10.8)

## 2019-11-10 PROCEDURE — 84484 ASSAY OF TROPONIN QUANT: CPT | Performed by: PHYSICIAN ASSISTANT

## 2019-11-10 PROCEDURE — 80053 COMPREHEN METABOLIC PANEL: CPT | Performed by: PHYSICIAN ASSISTANT

## 2019-11-10 PROCEDURE — 93005 ELECTROCARDIOGRAM TRACING: CPT | Performed by: PHYSICIAN ASSISTANT

## 2019-11-10 PROCEDURE — G0378 HOSPITAL OBSERVATION PER HR: HCPCS

## 2019-11-10 PROCEDURE — 96372 THER/PROPH/DIAG INJ SC/IM: CPT

## 2019-11-10 PROCEDURE — 25010000002 ENOXAPARIN PER 10 MG: Performed by: PHYSICIAN ASSISTANT

## 2019-11-10 PROCEDURE — 99220 PR INITIAL OBSERVATION CARE/DAY 70 MINUTES: CPT | Performed by: HOSPITALIST

## 2019-11-10 PROCEDURE — 0100U HC BIOFIRE FILMARRAY RESP PANEL 2: CPT | Performed by: HOSPITALIST

## 2019-11-10 PROCEDURE — 83880 ASSAY OF NATRIURETIC PEPTIDE: CPT | Performed by: PHYSICIAN ASSISTANT

## 2019-11-10 PROCEDURE — 82962 GLUCOSE BLOOD TEST: CPT

## 2019-11-10 PROCEDURE — 94799 UNLISTED PULMONARY SVC/PX: CPT

## 2019-11-10 PROCEDURE — 93010 ELECTROCARDIOGRAM REPORT: CPT | Performed by: INTERNAL MEDICINE

## 2019-11-10 PROCEDURE — 85379 FIBRIN DEGRADATION QUANT: CPT | Performed by: PHYSICIAN ASSISTANT

## 2019-11-10 PROCEDURE — 93005 ELECTROCARDIOGRAM TRACING: CPT | Performed by: EMERGENCY MEDICINE

## 2019-11-10 PROCEDURE — 71046 X-RAY EXAM CHEST 2 VIEWS: CPT

## 2019-11-10 PROCEDURE — 99284 EMERGENCY DEPT VISIT MOD MDM: CPT | Performed by: PHYSICIAN ASSISTANT

## 2019-11-10 PROCEDURE — 93005 ELECTROCARDIOGRAM TRACING: CPT | Performed by: HOSPITALIST

## 2019-11-10 PROCEDURE — 63710000001 INSULIN DETEMIR PER 5 UNITS: Performed by: HOSPITALIST

## 2019-11-10 PROCEDURE — 83036 HEMOGLOBIN GLYCOSYLATED A1C: CPT | Performed by: HOSPITALIST

## 2019-11-10 PROCEDURE — 84484 ASSAY OF TROPONIN QUANT: CPT | Performed by: HOSPITALIST

## 2019-11-10 PROCEDURE — 85025 COMPLETE CBC W/AUTO DIFF WBC: CPT | Performed by: PHYSICIAN ASSISTANT

## 2019-11-10 PROCEDURE — 99284 EMERGENCY DEPT VISIT MOD MDM: CPT

## 2019-11-10 RX ORDER — METOPROLOL SUCCINATE 50 MG/1
100 TABLET, EXTENDED RELEASE ORAL DAILY
Status: DISCONTINUED | OUTPATIENT
Start: 2019-11-11 | End: 2019-11-11 | Stop reason: HOSPADM

## 2019-11-10 RX ORDER — PANTOPRAZOLE SODIUM 40 MG/1
40 TABLET, DELAYED RELEASE ORAL EVERY MORNING
Status: DISCONTINUED | OUTPATIENT
Start: 2019-11-11 | End: 2019-11-11 | Stop reason: HOSPADM

## 2019-11-10 RX ORDER — LOSARTAN POTASSIUM AND HYDROCHLOROTHIAZIDE 25; 100 MG/1; MG/1
1 TABLET ORAL DAILY
COMMUNITY
End: 2021-03-22

## 2019-11-10 RX ORDER — DEXTROSE MONOHYDRATE 25 G/50ML
25 INJECTION, SOLUTION INTRAVENOUS
Status: DISCONTINUED | OUTPATIENT
Start: 2019-11-10 | End: 2019-11-11 | Stop reason: HOSPADM

## 2019-11-10 RX ORDER — SODIUM CHLORIDE 9 MG/ML
40 INJECTION, SOLUTION INTRAVENOUS AS NEEDED
Status: DISCONTINUED | OUTPATIENT
Start: 2019-11-10 | End: 2019-11-11 | Stop reason: HOSPADM

## 2019-11-10 RX ORDER — SODIUM CHLORIDE 0.9 % (FLUSH) 0.9 %
10 SYRINGE (ML) INJECTION EVERY 12 HOURS SCHEDULED
Status: DISCONTINUED | OUTPATIENT
Start: 2019-11-10 | End: 2019-11-11 | Stop reason: HOSPADM

## 2019-11-10 RX ORDER — ATORVASTATIN CALCIUM 20 MG/1
20 TABLET, FILM COATED ORAL NIGHTLY
Status: DISCONTINUED | OUTPATIENT
Start: 2019-11-10 | End: 2019-11-11

## 2019-11-10 RX ORDER — SODIUM CHLORIDE 0.9 % (FLUSH) 0.9 %
10 SYRINGE (ML) INJECTION AS NEEDED
Status: DISCONTINUED | OUTPATIENT
Start: 2019-11-10 | End: 2019-11-11 | Stop reason: HOSPADM

## 2019-11-10 RX ORDER — NITROGLYCERIN 0.4 MG/1
0.4 TABLET SUBLINGUAL
Status: DISCONTINUED | OUTPATIENT
Start: 2019-11-10 | End: 2019-11-11 | Stop reason: HOSPADM

## 2019-11-10 RX ORDER — ACETAMINOPHEN 325 MG/1
650 TABLET ORAL EVERY 6 HOURS PRN
Status: DISCONTINUED | OUTPATIENT
Start: 2019-11-10 | End: 2019-11-11 | Stop reason: HOSPADM

## 2019-11-10 RX ORDER — NICOTINE POLACRILEX 4 MG
15 LOZENGE BUCCAL
Status: DISCONTINUED | OUTPATIENT
Start: 2019-11-10 | End: 2019-11-11 | Stop reason: HOSPADM

## 2019-11-10 RX ORDER — TRAMADOL HYDROCHLORIDE 50 MG/1
50 TABLET ORAL EVERY 6 HOURS PRN
Status: DISCONTINUED | OUTPATIENT
Start: 2019-11-10 | End: 2019-11-11 | Stop reason: HOSPADM

## 2019-11-10 RX ORDER — ASPIRIN 81 MG/1
324 TABLET, CHEWABLE ORAL ONCE
Status: COMPLETED | OUTPATIENT
Start: 2019-11-10 | End: 2019-11-10

## 2019-11-10 RX ORDER — TRAMADOL HYDROCHLORIDE 50 MG/1
50 TABLET ORAL EVERY 6 HOURS PRN
COMMUNITY

## 2019-11-10 RX ORDER — ASPIRIN 325 MG
325 TABLET, DELAYED RELEASE (ENTERIC COATED) ORAL DAILY
Status: DISCONTINUED | OUTPATIENT
Start: 2019-11-11 | End: 2019-11-11 | Stop reason: HOSPADM

## 2019-11-10 RX ADMIN — SODIUM CHLORIDE, PRESERVATIVE FREE 10 ML: 5 INJECTION INTRAVENOUS at 20:25

## 2019-11-10 RX ADMIN — ENOXAPARIN SODIUM 160 MG: 80 INJECTION SUBCUTANEOUS at 14:49

## 2019-11-10 RX ADMIN — ASPIRIN 81 MG 324 MG: 81 TABLET ORAL at 13:25

## 2019-11-10 RX ADMIN — INSULIN DETEMIR 25 UNITS: 100 INJECTION, SOLUTION SUBCUTANEOUS at 23:45

## 2019-11-10 RX ADMIN — ACETAMINOPHEN 650 MG: 325 TABLET, FILM COATED ORAL at 23:45

## 2019-11-10 NOTE — PLAN OF CARE
Problem: Patient Care Overview  Goal: Plan of Care Review  Outcome: Ongoing (interventions implemented as appropriate)   11/10/19 1633   Coping/Psychosocial   Plan of Care Reviewed With patient   Plan of Care Review   Progress no change   OTHER   Outcome Summary pt admitted for SOA and chest pain- no complaints since admission. troponin negative, chest xray shows no acute findings. will be NPO after midnight.      Goal: Individualization and Mutuality  Outcome: Ongoing (interventions implemented as appropriate)   11/10/19 1633   Individualization   Patient Specific Preferences none voiced     Goal: Discharge Needs Assessment  Outcome: Ongoing (interventions implemented as appropriate)   11/10/19 1506 11/10/19 1508 11/10/19 1633   Discharge Needs Assessment   Readmission Within the Last 30 Days --  --  no previous admission in last 30 days   Patient/Family Anticipates Transition to --  home with family --    Patient/Family Anticipated Services at Transition --  none --    Transportation Anticipated --  car, drives self;family or friend will provide --    Disability   Equipment Currently Used at Home none --  --        Problem: Fall Risk (Adult)  Goal: Identify Related Risk Factors and Signs and Symptoms  Outcome: Ongoing (interventions implemented as appropriate)   11/10/19 1633   Fall Risk (Adult)   Related Risk Factors (Fall Risk) environment unfamiliar     Goal: Absence of Fall  Outcome: Ongoing (interventions implemented as appropriate)   11/10/19 1633   Fall Risk (Adult)   Absence of Fall making progress toward outcome       Problem: Pain, Chronic (Adult)  Goal: Acceptable Pain/Comfort Level and Functional Ability  Outcome: Ongoing (interventions implemented as appropriate)   11/10/19 1633   Pain, Chronic (Adult)   Acceptable Pain/Comfort Level and Functional Ability making progress toward outcome

## 2019-11-10 NOTE — ED PROVIDER NOTES
EMERGENCY DEPARTMENT ENCOUNTER      Room Number: 2/02    History is provided by the patient, no translation services needed    HPI:    Chief complaint: Chest pain    Location: Substernal radiating to back and left jaw    Quality/Severity: Pressure, 5/10    Timing/Duration: Started an hour ago, lasted approximately 30 minutes    Modifying Factors: Pain improved after rest.    Associated Symptoms: Positive for chest pain, shortness of air, diaphoresis, nausea, dizziness, cough.  Patient denies any vomiting or diarrhea or abdominal pain, denies any syncope.    Narrative: Pt is a 44 y.o. male who presents complaining of an episode of chest pain today that lasted for 30 minutes.  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  He states this episode lasted approximately 30 minutes, he continued to try to preach, however he ended up sitting down on a piano bench to catch his breath and states the episode subsided.  He has a PMH significant for HTN, HLD, DM type II.  He states he has never experienced pain like this before.  He had a normal stress test approximately 6 or 7 years ago, he has never had a heart cath, he states his father and grandfather both had MIs in their 40s.  He has not nor has he ever been a smoker.  He states he does not take any aspirin or blood thinners.  Patient also reports he has had a productive cough for the past 3 days, he states he has been coughing up brownish sputum.  Denies any hemoptysis.      PMD: Theron Manzo APRN    REVIEW OF SYSTEMS  Review of Systems   Constitutional: Positive for diaphoresis. Negative for chills and fever.   HENT: Negative for rhinorrhea and sore throat.    Respiratory: Positive for cough and shortness of breath. Negative for wheezing.    Cardiovascular: Positive for chest  pain.   Gastrointestinal: Positive for nausea. Negative for abdominal pain, diarrhea and vomiting.   Genitourinary: Negative for difficulty urinating and dysuria.   Musculoskeletal: Negative for arthralgias and myalgias.   Skin: Negative for color change and pallor.   Neurological: Positive for light-headedness. Negative for syncope and headaches.   Psychiatric/Behavioral: Negative for confusion. The patient is not nervous/anxious.          PAST MEDICAL HISTORY  Active Ambulatory Problems     Diagnosis Date Noted   • Iron deficiency anemia 08/28/2019     Resolved Ambulatory Problems     Diagnosis Date Noted   • No Resolved Ambulatory Problems     Past Medical History:   Diagnosis Date   • Diabetes mellitus (CMS/[facility])    • Fracture of C4 vertebra, open (CMS/[facility])    • GERD (gastroesophageal reflux disease)    • Hyperlipidemia    • Hypertension    • Sleep apnea        PAST SURGICAL HISTORY  Past Surgical History:   Procedure Laterality Date   • COLONOSCOPY N/A 9/17/2019    Procedure: Colonoscopy,polypectomy;  Surgeon: Deborah Gamez DO;  Location: [facility];  Service: Gastroenterology   • ENDOSCOPY N/A 9/17/2019    Procedure: Esophagogastroduodenoscoy, biopsy  ,polypectomy;  Surgeon: Deborah Gamez DO;  Location: [facility];  Service: Gastroenterology   • GANGLION CYST EXCISION     • UMBILICAL HERNIA REPAIR         FAMILY HISTORY  Family History   Problem Relation Age of Onset   • Diabetes Father    • Heart disease Father    • Hypertension Father        SOCIAL HISTORY  Social History     Socioeconomic History   • Marital status:      Spouse name: Not on file   • Number of children: Not on file   • Years of education: Not on file   • Highest education level: Not on file   Tobacco Use   • Smoking status: Never Smoker   • Smokeless tobacco: Never Used   Substance and Sexual Activity   • Alcohol use: No   • Drug use: No   • Sexual activity: Defer       ALLERGIES  Sulfa antibiotics      Current  Facility-Administered Medications:   •  enoxaparin (LOVENOX) syringe 160 mg, 1 mg/kg, Subcutaneous, Once, Paty Toro PA-C  •  [COMPLETED] Insert peripheral IV, , , Once **AND** sodium chloride 0.9 % flush 10 mL, 10 mL, Intravenous, PRN, Paty Toro PA-C    Current Outpatient Medications:   •  Ferrous Sulfate Dried (FERROUS SULFATE CR PO), Take  by mouth., Disp: , Rfl:   •  glipiZIDE (GLUCOTROL) 10 MG tablet, Take 10 mg by mouth 2 (Two) Times a Day Before Meals., Disp: , Rfl:   •  Insulin Degludec (TRESIBA FLEXTOUCH SC), Inject  under the skin., Disp: , Rfl:   •  LOSARTAN POTASSIUM PO, Take  by mouth., Disp: , Rfl:   •  metFORMIN (GLUCOPHAGE) 1000 MG tablet, Take 1,000 mg by mouth 2 (Two) Times a Day With Meals., Disp: , Rfl:   •  METOPROLOL SUCCINATE ER PO, Take  by mouth., Disp: , Rfl:   •  SIMVASTATIN PO, Take  by mouth., Disp: , Rfl:   •  traMADol (ULTRAM) 50 MG tablet, Take 50 mg by mouth Every 6 (Six) Hours As Needed for Moderate Pain ., Disp: , Rfl:   •  vitamin D (ERGOCALCIFEROL) 85948 units capsule capsule, Take 50,000 Units by mouth 1 (One) Time Per Week., Disp: , Rfl:   •  esomeprazole (nexIUM) 20 MG capsule, Take 20 mg by mouth Every Morning Before Breakfast., Disp: , Rfl:   •  FENOFIBRATE PO, Take  by mouth., Disp: , Rfl:     PHYSICAL EXAM  ED Triage Vitals [11/10/19 1302]   Temp Heart Rate Resp BP SpO2   -- 88 20 -- 98 %      Temp src Heart Rate Source Patient Position BP Location FiO2 (%)   -- Monitor Lying -- --       Physical Exam   Constitutional: He is oriented to person, place, and time. No distress.   Morbidly obese   HENT:   Head: Normocephalic and atraumatic.   Mouth/Throat: Oropharynx is clear and moist.   Eyes: Conjunctivae are normal. Pupils are equal, round, and reactive to light.   Neck: Normal range of motion. Neck supple.   Cardiovascular: Normal rate, regular rhythm, normal heart sounds and intact distal pulses.   Pulmonary/Chest: Effort normal and breath sounds  normal. He exhibits no tenderness.   Abdominal: Soft. Bowel sounds are normal. There is no tenderness. There is no guarding.   Musculoskeletal: Normal range of motion. He exhibits no edema.   Neurological: He is alert and oriented to person, place, and time. Gait normal. GCS score is 15.   Skin: Skin is warm and dry. He is not diaphoretic.   Psychiatric: Mood, memory, affect and judgment normal.         LAB RESULTS  Results for orders placed or performed during the hospital encounter of 11/10/19   Comprehensive Metabolic Panel   Result Value Ref Range    Glucose 174 (H) 65 - 99 mg/dL    BUN 14 6 - 20 mg/dL    Creatinine 0.77 0.76 - 1.27 mg/dL    Sodium 138 136 - 145 mmol/L    Potassium 4.2 3.5 - 5.2 mmol/L    Chloride 103 98 - 107 mmol/L    CO2 22.1 22.0 - 29.0 mmol/L    Calcium 9.0 8.6 - 10.5 mg/dL    Total Protein 6.8 6.0 - 8.5 g/dL    Albumin 3.80 3.50 - 5.20 g/dL    ALT (SGPT) 26 1 - 41 U/L    AST (SGOT) 17 1 - 40 U/L    Alkaline Phosphatase 78 39 - 117 U/L    Total Bilirubin 0.2 0.2 - 1.2 mg/dL    eGFR Non African Amer 111 >60 mL/min/1.73    Globulin 3.0 gm/dL    A/G Ratio 1.3 g/dL    BUN/Creatinine Ratio 18.2 7.0 - 25.0    Anion Gap 12.9 5.0 - 15.0 mmol/L   BNP   Result Value Ref Range    proBNP 14.3 5.0 - 450.0 pg/mL   Troponin   Result Value Ref Range    Troponin T <0.010 0.000-<0.030 ng/mL   D-dimer, Quantitative   Result Value Ref Range    D-Dimer, Quantitative <0.27 0.00 - 0.46 MCGFEU/mL   CBC Auto Differential   Result Value Ref Range    WBC 10.24 3.40 - 10.80 10*3/mm3    RBC 5.70 4.14 - 5.80 10*6/mm3    Hemoglobin 14.6 13.0 - 17.7 g/dL    Hematocrit 46.1 37.5 - 51.0 %    MCV 80.9 79.0 - 97.0 fL    MCH 25.6 (L) 26.6 - 33.0 pg    MCHC 31.7 31.5 - 35.7 g/dL    RDW 14.6 12.3 - 15.4 %    RDW-SD 42.7 37.0 - 54.0 fl    MPV 10.4 6.0 - 12.0 fL    Platelets 275 140 - 450 10*3/mm3    Neutrophil % 63.9 42.7 - 76.0 %    Lymphocyte % 24.4 19.6 - 45.3 %    Monocyte % 7.5 5.0 - 12.0 %    Eosinophil % 3.1 0.3 - 6.2 %     Basophil % 0.6 0.0 - 1.5 %    Immature Grans % 0.5 0.0 - 0.5 %    Neutrophils, Absolute 6.54 1.70 - 7.00 10*3/mm3    Lymphocytes, Absolute 2.50 0.70 - 3.10 10*3/mm3    Monocytes, Absolute 0.77 0.10 - 0.90 10*3/mm3    Eosinophils, Absolute 0.32 0.00 - 0.40 10*3/mm3    Basophils, Absolute 0.06 0.00 - 0.20 10*3/mm3    Immature Grans, Absolute 0.05 0.00 - 0.05 10*3/mm3    nRBC 0.0 0.0 - 0.2 /100 WBC         I ordered the above labs and reviewed the results    RADIOLOGY  Xr Chest 2 View    Result Date: 11/10/2019  Narrative: CR Chest 2 Vws INDICATION:  Shortness of air with productive cough. Chest pain for 3 hours. COMPARISON:  None. FINDINGS: PA and lateral views of the chest.  Heart and mediastinal contours are normal. The lungs are clear. No pneumothorax or pleural effusion.      Impression: No acute cardiopulmonary findings. Signer Name: RAJAN Bishop MD  Signed: 11/10/2019 1:35 PM  Workstation Name: [facility]  [facility]      I ordered the above radiologic testing and reviewed the results    PROCEDURES  Procedures      PROGRESS AND CONSULTS  ED Course as of Nov 10 1427   Sun Nov 10, 2019   1300 EKG         EKG time / Interpretation time: 1254/1256  Rhythm/Rate: Sinus rhythm, 84   NY: 152  QRS, axis: 66  QTc 427  ST and T waves: There is no ST elevation or depression, there are T wave inversions in lead III.  EKG Tracing Interpreted Contemporaneously by me, independently viewed by me and MD.  T wave inversions in lead III today are new compared to prior EKG from 9/17/2019.      [KS]   1358 CONSULT  Time 1358  Discussed case with Dr Hart, hospitalist.  Reviewed history, exam, results and treatments.  Discussed concerns and plan of care. Dr Hart requested that we consult cardiology prior to admission to see if they would prefer patient be transferred to [facility].    [KS]   1410 CONSULT  Time 1410  Discussed case with Dr Bruner, cardiology.  Reviewed history, exam, results and  treatments.  Discussed concerns and plan of care. Dr Bruner states she recommends that we admit patient to [facility] for further work-up and treatment.  [KS]   1412 CONSULT  Time 1412  Discussed case with Dr Hart, hospitalist.  Informed her Dr. Bruner advises to admit patient to [facility] for further work-up.  Dr Hart accepts pt to be admitted to [facility] with telemetry for observation.    [KS]   1422 I have discussed lab and imaging findings with patient and his wife.  I discussed that his chest pain today is quite suspicious for cardiac etiology, and we recommend he stay in the hospital for observation and further work-up.  Patient is agreeable with this plan and verbalizes understanding.  He has no further questions at this time.  [KS]      ED Course User Index  [KS] Paty Toro, JOSE           MEDICAL DECISION MAKING  Results were reviewed/discussed with the patient and they were also made aware of online access. Pt also made aware that some labs, such as cultures, will not be resulted during ER visit and follow up with PMD is necessary.     MDM      HEART Score (for prediction of 6-week risk of major adverse cardiac event) reviewed and/or performed as part of the patient evaluation and treatment planning process.  The result associated with this review/performance is: 4    My differential diagnosis for chest pain includes but is not limited to:  Muscle strain, costochondritis, myositis, pleurisy, rib fracture, intercostal neuritis, herpes zoster, tumor, myocardial infarction, coronary syndrome, unstable angina, angina, aortic dissection, mitral valve prolapse, pericarditis, palpitations, pulmonary embolus, pneumonia, pneumothorax, lung cancer, GERD, esophagitis, esophageal spasm      DIAGNOSIS  Final diagnoses:   Chest pain with high risk for cardiac etiology       Latest Documented Vital Signs:  As of 2:27 PM  BP- 137/88 HR- 83 Temp- 98.5 °F  (36.9 °C) (Oral) O2 sat- 99%    DISPOSITION  Patient admitted to [facility].       Medication List      No changes were made to your prescriptions during this visit.                 Dictated utilizing Dragon dictation       Paty Toro PA-C  11/10/19 3299

## 2019-11-11 ENCOUNTER — APPOINTMENT (OUTPATIENT)
Dept: ULTRASOUND IMAGING | Facility: HOSPITAL | Age: 44
End: 2019-11-11

## 2019-11-11 ENCOUNTER — APPOINTMENT (OUTPATIENT)
Dept: CARDIOLOGY | Facility: HOSPITAL | Age: 44
End: 2019-11-11

## 2019-11-11 VITALS
BODY MASS INDEX: 45.1 KG/M2 | TEMPERATURE: 96.7 F | RESPIRATION RATE: 18 BRPM | HEART RATE: 79 BPM | SYSTOLIC BLOOD PRESSURE: 147 MMHG | OXYGEN SATURATION: 93 % | HEIGHT: 70 IN | WEIGHT: 315 LBS | DIASTOLIC BLOOD PRESSURE: 81 MMHG

## 2019-11-11 LAB
ARTICHOKE IGE QN: 220 MG/DL (ref 0–100)
B PARAPERT DNA SPEC QL NAA+PROBE: NOT DETECTED
B PERT DNA SPEC QL NAA+PROBE: NOT DETECTED
BH CV ECHO MEAS - AO MAX PG (FULL): 1.7 MMHG
BH CV ECHO MEAS - AO MAX PG: 6.9 MMHG
BH CV ECHO MEAS - AO MEAN PG (FULL): 1 MMHG
BH CV ECHO MEAS - AO MEAN PG: 4 MMHG
BH CV ECHO MEAS - AO ROOT AREA (BSA CORRECTED): 1.2
BH CV ECHO MEAS - AO ROOT AREA: 8 CM^2
BH CV ECHO MEAS - AO ROOT DIAM: 3.2 CM
BH CV ECHO MEAS - AO V2 MAX: 131 CM/SEC
BH CV ECHO MEAS - AO V2 MEAN: 90.1 CM/SEC
BH CV ECHO MEAS - AO V2 VTI: 29 CM
BH CV ECHO MEAS - ASC AORTA: 2.8 CM
BH CV ECHO MEAS - AVA(I,A): 3.7 CM^2
BH CV ECHO MEAS - AVA(I,D): 3.7 CM^2
BH CV ECHO MEAS - AVA(V,A): 3.9 CM^2
BH CV ECHO MEAS - AVA(V,D): 3.9 CM^2
BH CV ECHO MEAS - BSA(HAYCOCK): 3 M^2
BH CV ECHO MEAS - BSA: 2.7 M^2
BH CV ECHO MEAS - BZI_BMI: 53.4 KILOGRAMS/M^2
BH CV ECHO MEAS - BZI_METRIC_HEIGHT: 177.8 CM
BH CV ECHO MEAS - BZI_METRIC_WEIGHT: 168.7 KG
BH CV ECHO MEAS - EDV(CUBED): 143.1 ML
BH CV ECHO MEAS - EDV(MOD-SP2): 131 ML
BH CV ECHO MEAS - EDV(MOD-SP4): 173 ML
BH CV ECHO MEAS - EDV(TEICH): 131.2 ML
BH CV ECHO MEAS - EF(CUBED): 76.4 %
BH CV ECHO MEAS - EF(MOD-BP): 64 %
BH CV ECHO MEAS - EF(MOD-SP2): 63.9 %
BH CV ECHO MEAS - EF(MOD-SP4): 63.9 %
BH CV ECHO MEAS - EF(TEICH): 68.1 %
BH CV ECHO MEAS - ESV(CUBED): 33.7 ML
BH CV ECHO MEAS - ESV(MOD-SP2): 47.3 ML
BH CV ECHO MEAS - ESV(MOD-SP4): 62.4 ML
BH CV ECHO MEAS - ESV(TEICH): 41.9 ML
BH CV ECHO MEAS - FS: 38.2 %
BH CV ECHO MEAS - IVS/LVPW: 0.99
BH CV ECHO MEAS - IVSD: 1 CM
BH CV ECHO MEAS - LAT PEAK E' VEL: 10 CM/SEC
BH CV ECHO MEAS - LV DIASTOLIC VOL/BSA (35-75): 63.7 ML/M^2
BH CV ECHO MEAS - LV MASS(C)D: 197.3 GRAMS
BH CV ECHO MEAS - LV MASS(C)DI: 72.6 GRAMS/M^2
BH CV ECHO MEAS - LV MAX PG: 5.2 MMHG
BH CV ECHO MEAS - LV MEAN PG: 3 MMHG
BH CV ECHO MEAS - LV SYSTOLIC VOL/BSA (12-30): 23 ML/M^2
BH CV ECHO MEAS - LV V1 MAX: 114 CM/SEC
BH CV ECHO MEAS - LV V1 MEAN: 76.3 CM/SEC
BH CV ECHO MEAS - LV V1 VTI: 24 CM
BH CV ECHO MEAS - LVIDD: 5.2 CM
BH CV ECHO MEAS - LVIDS: 3.2 CM
BH CV ECHO MEAS - LVLD AP2: 9.2 CM
BH CV ECHO MEAS - LVLD AP4: 9.3 CM
BH CV ECHO MEAS - LVLS AP2: 7.5 CM
BH CV ECHO MEAS - LVLS AP4: 7.6 CM
BH CV ECHO MEAS - LVOT AREA (M): 4.5 CM^2
BH CV ECHO MEAS - LVOT AREA: 4.5 CM^2
BH CV ECHO MEAS - LVOT DIAM: 2.4 CM
BH CV ECHO MEAS - LVPWD: 1 CM
BH CV ECHO MEAS - MED PEAK E' VEL: 8.5 CM/SEC
BH CV ECHO MEAS - MV A DUR: 0.11 SEC
BH CV ECHO MEAS - MV A MAX VEL: 74.7 CM/SEC
BH CV ECHO MEAS - MV DEC SLOPE: 351 CM/SEC^2
BH CV ECHO MEAS - MV DEC TIME: 151 SEC
BH CV ECHO MEAS - MV E MAX VEL: 82.9 CM/SEC
BH CV ECHO MEAS - MV E/A: 1.1
BH CV ECHO MEAS - MV MAX PG: 3.7 MMHG
BH CV ECHO MEAS - MV MEAN PG: 2 MMHG
BH CV ECHO MEAS - MV P1/2T MAX VEL: 95.6 CM/SEC
BH CV ECHO MEAS - MV P1/2T: 79.8 MSEC
BH CV ECHO MEAS - MV V2 MAX: 96.6 CM/SEC
BH CV ECHO MEAS - MV V2 MEAN: 58 CM/SEC
BH CV ECHO MEAS - MV V2 VTI: 28.1 CM
BH CV ECHO MEAS - MVA P1/2T LCG: 2.3 CM^2
BH CV ECHO MEAS - MVA(P1/2T): 2.8 CM^2
BH CV ECHO MEAS - MVA(VTI): 3.9 CM^2
BH CV ECHO MEAS - RAP SYSTOLE: 8 MMHG
BH CV ECHO MEAS - SI(AO): 85.8 ML/M^2
BH CV ECHO MEAS - SI(CUBED): 40.2 ML/M^2
BH CV ECHO MEAS - SI(LVOT): 40 ML/M^2
BH CV ECHO MEAS - SI(MOD-SP2): 30.8 ML/M^2
BH CV ECHO MEAS - SI(MOD-SP4): 40.7 ML/M^2
BH CV ECHO MEAS - SI(TEICH): 32.9 ML/M^2
BH CV ECHO MEAS - SV(AO): 233.2 ML
BH CV ECHO MEAS - SV(CUBED): 109.4 ML
BH CV ECHO MEAS - SV(LVOT): 108.6 ML
BH CV ECHO MEAS - SV(MOD-SP2): 83.7 ML
BH CV ECHO MEAS - SV(MOD-SP4): 110.6 ML
BH CV ECHO MEAS - SV(TEICH): 89.3 ML
BH CV ECHO MEAS - TAPSE (>1.6): 3.1 CM
BH CV ECHO MEASUREMENTS AVERAGE E/E' RATIO: 8.96
BH CV VAS BP RIGHT ARM: NORMAL MMHG
BH CV XLRA - RV BASE: 3.8 CM
BH CV XLRA - TDI S': 13.6 CM/SEC
C PNEUM DNA NPH QL NAA+NON-PROBE: NOT DETECTED
CHOLEST SERPL-MCNC: 335 MG/DL (ref 0–200)
FLUAV H1 2009 PAND RNA NPH QL NAA+PROBE: NOT DETECTED
FLUAV H1 HA GENE NPH QL NAA+PROBE: NOT DETECTED
FLUAV H3 RNA NPH QL NAA+PROBE: NOT DETECTED
FLUAV SUBTYP SPEC NAA+PROBE: NOT DETECTED
FLUBV RNA ISLT QL NAA+PROBE: NOT DETECTED
GLUCOSE BLDC GLUCOMTR-MCNC: 136 MG/DL (ref 70–130)
GLUCOSE BLDC GLUCOMTR-MCNC: 143 MG/DL (ref 70–130)
HADV DNA SPEC NAA+PROBE: NOT DETECTED
HBA1C MFR BLD: 8.8 % (ref 4.8–5.6)
HCOV 229E RNA SPEC QL NAA+PROBE: NOT DETECTED
HCOV HKU1 RNA SPEC QL NAA+PROBE: NOT DETECTED
HCOV NL63 RNA SPEC QL NAA+PROBE: NOT DETECTED
HCOV OC43 RNA SPEC QL NAA+PROBE: NOT DETECTED
HDLC SERPL-MCNC: 32 MG/DL (ref 40–60)
HMPV RNA NPH QL NAA+NON-PROBE: NOT DETECTED
HPIV1 RNA SPEC QL NAA+PROBE: NOT DETECTED
HPIV2 RNA SPEC QL NAA+PROBE: NOT DETECTED
HPIV3 RNA NPH QL NAA+PROBE: NOT DETECTED
HPIV4 P GENE NPH QL NAA+PROBE: NOT DETECTED
LDLC SERPL CALC-MCNC: ABNORMAL MG/DL
LDLC/HDLC SERPL: ABNORMAL {RATIO}
LEFT ATRIUM VOLUME INDEX: 15 ML/M2
LIPASE SERPL-CCNC: 32 U/L (ref 13–60)
M PNEUMO IGG SER IA-ACNC: NOT DETECTED
MAXIMAL PREDICTED HEART RATE: 178 BPM
RHINOVIRUS RNA SPEC NAA+PROBE: DETECTED
RSV RNA NPH QL NAA+NON-PROBE: NOT DETECTED
STRESS TARGET HR: 151 BPM
TRIGL SERPL-MCNC: 749 MG/DL (ref 0–150)
TROPONIN T SERPL-MCNC: <0.01 NG/ML (ref 0–0.03)
VLDLC SERPL-MCNC: ABNORMAL MG/DL

## 2019-11-11 PROCEDURE — G0378 HOSPITAL OBSERVATION PER HR: HCPCS

## 2019-11-11 PROCEDURE — 99234 HOSP IP/OBS SM DT SF/LOW 45: CPT | Performed by: HOSPITALIST

## 2019-11-11 PROCEDURE — 84484 ASSAY OF TROPONIN QUANT: CPT | Performed by: HOSPITALIST

## 2019-11-11 PROCEDURE — 83721 ASSAY OF BLOOD LIPOPROTEIN: CPT | Performed by: HOSPITALIST

## 2019-11-11 PROCEDURE — 63710000001 INSULIN DETEMIR PER 5 UNITS: Performed by: HOSPITALIST

## 2019-11-11 PROCEDURE — 80061 LIPID PANEL: CPT | Performed by: HOSPITALIST

## 2019-11-11 PROCEDURE — 76705 ECHO EXAM OF ABDOMEN: CPT

## 2019-11-11 PROCEDURE — 93010 ELECTROCARDIOGRAM REPORT: CPT | Performed by: INTERNAL MEDICINE

## 2019-11-11 PROCEDURE — 83690 ASSAY OF LIPASE: CPT | Performed by: HOSPITALIST

## 2019-11-11 PROCEDURE — 25010000002 PERFLUTREN (DEFINITY) 8.476 MG IN SODIUM CHLORIDE 0.9 % 10 ML INJECTION: Performed by: HOSPITALIST

## 2019-11-11 PROCEDURE — 99244 OFF/OP CNSLTJ NEW/EST MOD 40: CPT | Performed by: INTERNAL MEDICINE

## 2019-11-11 PROCEDURE — 93306 TTE W/DOPPLER COMPLETE: CPT

## 2019-11-11 PROCEDURE — 82962 GLUCOSE BLOOD TEST: CPT

## 2019-11-11 PROCEDURE — 93306 TTE W/DOPPLER COMPLETE: CPT | Performed by: INTERNAL MEDICINE

## 2019-11-11 RX ORDER — ATORVASTATIN CALCIUM 40 MG/1
40 TABLET, FILM COATED ORAL NIGHTLY
Status: DISCONTINUED | OUTPATIENT
Start: 2019-11-11 | End: 2019-11-11 | Stop reason: HOSPADM

## 2019-11-11 RX ORDER — SPIRONOLACTONE 25 MG/1
25 TABLET ORAL DAILY
Status: DISCONTINUED | OUTPATIENT
Start: 2019-11-11 | End: 2019-11-11 | Stop reason: HOSPADM

## 2019-11-11 RX ORDER — ACETAMINOPHEN 325 MG/1
650 TABLET ORAL EVERY 6 HOURS PRN
Start: 2019-11-11

## 2019-11-11 RX ADMIN — METOPROLOL SUCCINATE 100 MG: 50 TABLET, EXTENDED RELEASE ORAL at 09:00

## 2019-11-11 RX ADMIN — ACETAMINOPHEN 650 MG: 325 TABLET, FILM COATED ORAL at 11:32

## 2019-11-11 RX ADMIN — PANTOPRAZOLE SODIUM 40 MG: 40 TABLET, DELAYED RELEASE ORAL at 09:03

## 2019-11-11 RX ADMIN — INSULIN DETEMIR 25 UNITS: 100 INJECTION, SOLUTION SUBCUTANEOUS at 09:06

## 2019-11-11 RX ADMIN — SODIUM CHLORIDE, PRESERVATIVE FREE 10 ML: 5 INJECTION INTRAVENOUS at 09:00

## 2019-11-11 RX ADMIN — HYDROCHLOROTHIAZIDE: 25 TABLET ORAL at 09:02

## 2019-11-11 RX ADMIN — ASPIRIN 325 MG: 325 TABLET, DELAYED RELEASE ORAL at 08:59

## 2019-11-11 RX ADMIN — PERFLUTREN 3 ML: 6.52 INJECTION, SUSPENSION INTRAVENOUS at 10:45

## 2019-11-11 RX ADMIN — SPIRONOLACTONE 25 MG: 25 TABLET ORAL at 09:02

## 2019-11-11 NOTE — DISCHARGE SUMMARY
Haris RENNER Jade  1975  [id number]    Hospitalists Discharge Summary    Date of Admission: 11/10/2019  Date of Discharge:  11/11/2019    History of Present Illness & Hospital Course Summary  Chest pain/ discharge per Dr. Rodriguez and she will arrange a stress Pet scan.    Primary Discharge Diagnoses & Secondary Discharge Diagnoses:        Chest pain etiology unknown / discharge per Dr. Rodriguez and she will arrange a stress Pet scan.     URI     Nausea and diarrhea after eating     Morbid obesity/ Body mass index is 53.41 kg/m².     DM2 on insulin     HTN     HLD     GERD       PCP  Patient Care Team:  Theron Manzo APRN as PCP - General (Family Medicine)    Consults:   Consults     Date and Time Order Name Status Description    11/10/2019 1507 Inpatient Cardiology Consult Completed           Operations and Procedures Performed:       Xr Chest 2 View    Result Date: 11/10/2019  Narrative: CR Chest 2 Vws INDICATION:  Shortness of air with productive cough. Chest pain for 3 hours. COMPARISON:  None. FINDINGS: PA and lateral views of the chest.  Heart and mediastinal contours are normal. The lungs are clear. No pneumothorax or pleural effusion.      Impression: No acute cardiopulmonary findings. Signer Name: RAJAN Bishop MD  Signed: 11/10/2019 1:35 PM  Workstation Name: [email or web address]  [facility] Gallbladder    Result Date: 11/11/2019  Narrative: ULTRASOUND ABDOMEN, LIMITED, 11/11/2019  HISTORY: nausea and diarrhea following meals for the past week. Shortness of air and chest pain 24 hours.  TECHNIQUE: Grayscale ultrasound imaging of the right upper quadrant was performed.  FINDINGS:  The pancreas was obscured by bowel gas and not visualized or assessed. The liver measures 19.7 cm. Echogenicity compared to the right kidney is most characteristic of hepatic steatosis. There is no focal liver mass or ascites. Probable small area of focal fatty sparing adjacent to the  gallbladder fossa. The gallbladder is unremarkable. Negative sonographic Nevarez sign. Extrahepatic common bile duct measures 4 mm. The right kidney is nonobstructed and measures 13.2 cm.      Impression: 1. Hepatic steatosis. 2. Nonvisualization of the pancreas. Otherwise negative study.  This report was finalized on 11/11/2019 8:51 AM by Dr. Cayden Riley MD.        Allergies:  is allergic to sulfa antibiotics.    Dino  reviewed    Discharge Medications:     Discharge Medications      ASK your doctor about these medications      Instructions Start Date   esomeprazole 20 MG capsule  Commonly known as:  nexIUM   20 mg, Oral, Every Morning Before Breakfast      glipizide 10 MG tablet  Commonly known as:  GLUCOTROL   10 mg, Oral, 2 Times Daily Before Meals      losartan-hydrochlorothiazide 100-25 MG per tablet  Commonly known as:  HYZAAR   1 tablet, Oral, Daily      metFORMIN 1000 MG tablet  Commonly known as:  GLUCOPHAGE   1,000 mg, Oral, 2 Times Daily With Meals      METOPROLOL SUCCINATE ER PO   100 mg, Oral, Daily      SIMVASTATIN PO   50 mg, Oral, Nightly      traMADol 50 MG tablet  Commonly known as:  ULTRAM   50 mg, Oral, Every 6 Hours PRN      TRESIBA FLEXTOUCH SC   30 Units, Subcutaneous, 2 Times Daily, Takes 30 units in AM and 30 units in PM      vitamin D 1.25 MG (34963 UT) capsule capsule  Commonly known as:  ERGOCALCIFEROL   50,000 Units, Oral, Weekly             Last Lab Results:   Lab Results (most recent)     Procedure Component Value Units Date/Time    POC Glucose Once [[id number]]  (Abnormal) Collected:  11/11/19 1139    Specimen:  Blood Updated:  11/11/19 1151     Glucose 136 mg/dL     Respiratory Panel, PCR - Swab, Nasopharynx [[id number]]  (Abnormal) Collected:  11/10/19 2351    Specimen:  Swab from Nasopharynx Updated:  11/11/19 1148     ADENOVIRUS, PCR Not Detected     Coronavirus 229E Not Detected     Coronavirus HKU1 Not Detected     Coronavirus NL63 Not Detected     Coronavirus OC43 Not  Detected     Human Metapneumovirus Not Detected     Human Rhinovirus/Enterovirus Detected     Influenza B PCR Not Detected     Parainfluenza Virus 1 Not Detected     Parainfluenza Virus 2 Not Detected     Parainfluenza Virus 3 Not Detected     Parainfluenza Virus 4 Not Detected     Bordetella pertussis pcr Not Detected     Influenza A H1 2009 PCR Not Detected     Chlamydophila pneumoniae PCR Not Detected     Mycoplasma pneumo by PCR Not Detected     Influenza A PCR Not Detected     Influenza A H3 Not Detected     Influenza A H1 Not Detected     RSV, PCR Not Detected     Bordetella parapertussis PCR Not Detected    POC Glucose Once [[id number]]  (Abnormal) Collected:  11/11/19 0738    Specimen:  Blood Updated:  11/11/19 0745     Glucose 143 mg/dL     Hemoglobin A1c [[id number]]  (Abnormal) Collected:  11/10/19 1307    Specimen:  Blood Updated:  11/11/19 0635     Hemoglobin A1C 8.80 %     Narrative:       Hemoglobin A1C Ranges:    Increased Risk for Diabetes  5.7% to 6.4%  Diabetes                     >= 6.5%  Diabetic Goal                < 7.0%    Lipase [[id number]]  (Normal) Collected:  11/11/19 0409    Specimen:  Blood Updated:  11/11/19 0628     Lipase 32 U/L     Lipid Panel [[id number]]  (Abnormal) Collected:  11/11/19 0409    Specimen:  Blood Updated:  11/11/19 0613     Total Cholesterol 335 mg/dL      Triglycerides 749 mg/dL      HDL Cholesterol 32 mg/dL      LDL Cholesterol  --     Comment: Unable to calculate        VLDL Cholesterol --     Comment: Unable to calculate        LDL/HDL Ratio --     Comment: Unable to calculate       Narrative:       Cholesterol Reference Ranges  (U.S. Department of Health and Human Services ATP III Classifications)    Desirable          <200 mg/dL  Borderline High    200-239 mg/dL  High Risk          >240 mg/dL      Triglyceride Reference Ranges  (U.S. Department of Health and Human Services ATP III Classifications)    Normal           <150 mg/dL  Borderline High  150-199  mg/dL  High             200-499 mg/dL  Very High        >500 mg/dL    HDL Reference Ranges  (U.S. Department of Health and Human Services ATP III Classifcations)    Low     <40 mg/dl (major risk factor for CHD)  High    >60 mg/dl ('negative' risk factor for CHD)        LDL Reference Ranges  (U.S. Department of Health and Human Services ATP III Classifcations)    Optimal          <100 mg/dL  Near Optimal     100-129 mg/dL  Borderline High  130-159 mg/dL  High             160-189 mg/dL  Very High        >189 mg/dL    LDL Cholesterol, Direct [[id number]] Collected:  11/11/19 0409    Specimen:  Blood Updated:  11/11/19 0606    Troponin [[id number]]  (Normal) Collected:  11/11/19 0111    Specimen:  Blood Updated:  11/11/19 0158     Troponin T <0.010 ng/mL     Narrative:       Troponin T Reference Range:  <= 0.03 ng/mL-   Negative for AMI  >0.03 ng/mL-     Abnormal for myocardial necrosis.  Clinicians would have to utilize clinical acumen, EKG, Troponin and serial changes to determine if it is an Acute Myocardial Infarction or myocardial injury due to an underlying chronic condition.     Troponin [[id number]]  (Normal) Collected:  11/10/19 1924    Specimen:  Blood Updated:  11/10/19 1947     Troponin T <0.010 ng/mL     Narrative:       Troponin T Reference Range:  <= 0.03 ng/mL-   Negative for AMI  >0.03 ng/mL-     Abnormal for myocardial necrosis.  Clinicians would have to utilize clinical acumen, EKG, Troponin and serial changes to determine if it is an Acute Myocardial Infarction or myocardial injury due to an underlying chronic condition.     BNP [[id number]]  (Normal) Collected:  11/10/19 1307    Specimen:  Blood Updated:  11/10/19 1342     proBNP 14.3 pg/mL     Narrative:       Among patients with dyspnea, NT-proBNP is highly sensitive for the detection of acute congestive heart failure. In addition NT-proBNP of <300 pg/ml effectively rules out acute congestive heart failure with 99% negative predictive value.    CBC &  Differential [[id number]] Collected:  11/10/19 1307    Specimen:  Blood Updated:  11/10/19 1339    Narrative:       The following orders were created for panel order CBC & Differential.  Procedure                               Abnormality         Status                     ---------                               -----------         ------                     CBC Auto Differential[[id number]]        Abnormal            Final result                 Please view results for these tests on the individual orders.    CBC Auto Differential [[id number]]  (Abnormal) Collected:  11/10/19 1307    Specimen:  Blood Updated:  11/10/19 1339     WBC 10.24 10*3/mm3      RBC 5.70 10*6/mm3      Hemoglobin 14.6 g/dL      Hematocrit 46.1 %      MCV 80.9 fL      MCH 25.6 pg      MCHC 31.7 g/dL      RDW 14.6 %      RDW-SD 42.7 fl      MPV 10.4 fL      Platelets 275 10*3/mm3      Neutrophil % 63.9 %      Lymphocyte % 24.4 %      Monocyte % 7.5 %      Eosinophil % 3.1 %      Basophil % 0.6 %      Immature Grans % 0.5 %      Neutrophils, Absolute 6.54 10*3/mm3      Lymphocytes, Absolute 2.50 10*3/mm3      Monocytes, Absolute 0.77 10*3/mm3      Eosinophils, Absolute 0.32 10*3/mm3      Basophils, Absolute 0.06 10*3/mm3      Immature Grans, Absolute 0.05 10*3/mm3      nRBC 0.0 /100 WBC     Comprehensive Metabolic Panel [[id number]]  (Abnormal) Collected:  11/10/19 1307    Specimen:  Blood Updated:  11/10/19 1333     Glucose 174 mg/dL      BUN 14 mg/dL      Creatinine 0.77 mg/dL      Sodium 138 mmol/L      Potassium 4.2 mmol/L      Chloride 103 mmol/L      CO2 22.1 mmol/L      Calcium 9.0 mg/dL      Total Protein 6.8 g/dL      Albumin 3.80 g/dL      ALT (SGPT) 26 U/L      AST (SGOT) 17 U/L      Alkaline Phosphatase 78 U/L      Total Bilirubin 0.2 mg/dL      eGFR Non African Amer 111 mL/min/1.73      Globulin 3.0 gm/dL      A/G Ratio 1.3 g/dL      BUN/Creatinine Ratio 18.2     Anion Gap 12.9 mmol/L     Narrative:       GFR Normal >60  Chronic Kidney  Disease <60  Kidney Failure <15    D-dimer, Quantitative [[id number]]  (Normal) Collected:  11/10/19 1307    Specimen:  Blood Updated:  11/10/19 1331     D-Dimer, Quantitative <0.27 MCGFEU/mL     Narrative:       Can be elevated in, but is not diagnostic for deep vein thrombosis (DVT) or pulmonary embolis (PE).  It is also elevated in other medical conditions.  Clinical correlation is required.  The negative cut-off value for the D-Dimer is 0.50 mcg FEU/mL for DVT and PE.        Imaging Results (Most Recent)     Procedure Component Value Units Date/Time    US Gallbladder [[id number]] Collected:  11/11/19 0848     Updated:  11/11/19 0853    Narrative:       ULTRASOUND ABDOMEN, LIMITED, 11/11/2019     HISTORY:  nausea and diarrhea following meals for the past week. Shortness of air  and chest pain 24 hours.     TECHNIQUE:  Grayscale ultrasound imaging of the right upper quadrant was performed.     FINDINGS:     The pancreas was obscured by bowel gas and not visualized or assessed.  The liver measures 19.7 cm. Echogenicity compared to the right kidney is  most characteristic of hepatic steatosis. There is no focal liver mass  or ascites. Probable small area of focal fatty sparing adjacent to the  gallbladder fossa. The gallbladder is unremarkable. Negative sonographic  Nevarez sign. Extrahepatic common bile duct measures 4 mm. The right  kidney is nonobstructed and measures 13.2 cm.       Impression:       1. Hepatic steatosis.  2. Nonvisualization of the pancreas. Otherwise negative study.     This report was finalized on 11/11/2019 8:51 AM by Dr. Cayden Riely MD.       XR Chest 2 View [[id number]] Collected:  11/10/19 1335     Updated:  11/10/19 1337    Narrative:       CR Chest 2 Vws    INDICATION:    Shortness of air with productive cough. Chest pain for 3 hours.    COMPARISON:    None.    FINDINGS:   PA and lateral views of the chest.  Heart and mediastinal contours are normal. The lungs are clear. No pneumothorax or  pleural effusion.        Impression:       No acute cardiopulmonary findings.    Signer Name: RAJAN Bishop MD   Signed: 11/10/2019 1:35 PM   Workstation Name: [facility]    [facility]          PROCEDURES      Condition on Discharge:  Stable and no chest pain    Physical Exam at Discharge  Vital Signs  Temp:  [96.7 °F (35.9 °C)-98.5 °F (36.9 °C)] 96.7 °F (35.9 °C)  Heart Rate:  [71-88] 79  Resp:  [18-20] 18  BP: (137-152)/() 147/81    Physical Exam   Constitutional: He is oriented to person, place, and time.   Morbidly obese   HENT:   Head: Atraumatic.   Cardiovascular: Normal rate and regular rhythm.   Pulmonary/Chest: Effort normal and breath sounds normal.   Abdominal:   Rotund   Musculoskeletal: Normal range of motion.   Ambulates well   Neurological: He is alert and oriented to person, place, and time.   Skin: Skin is warm and dry.   Psychiatric: He has a normal mood and affect. His behavior is normal. Judgment and thought content normal.   Nursing note and vitals reviewed.        Discharge Disposition  Home and will get PET stress test as an out patient    Visiting Nurse:    No     Home PT/OT:  No     Home Safety Evaluation:  No     DME  None    Discharge Diet:      Dietary Orders (From admission, onward)    Start     Ordered    11/11/19 0001  NPO Diet  Diet Effective Midnight      11/10/19 1507          Activity at Discharge:  As tolerated    Pre-discharge education  Medication Review  Appointment review  Diet review        Follow-up Appointments  No future appointments.      Test Results Pending at Discharge   Order Current Status    LDL Cholesterol, Direct In process           Tanvi English DO  11/11/19  12:06 PM    Time: Discharge 30 min min (if over 30 minutes give explanation as to why it took greater than 30 minutes)

## 2019-11-11 NOTE — NURSING NOTE
Discharge Planning Assessment   Annemarie Ledesma     Patient Name: Haris Hansen  MRN: [id number]  Today's Date: 11/11/2019    Admit Date: 11/10/2019    Discharge Needs Assessment     Row Name 11/11/19 1116       Living Environment    Lives With  child(bogdan), dependent;spouse    Name(s) of Who Lives With Patient  Wife Magda and 4 childre ages 20,13, 12 & 10    Current Living Arrangements  home/apartment/condo    Primary Care Provided by  self    Provides Primary Care For  child(bogdan)    Family Caregiver if Needed  spouse;child(bogdan), adult    Quality of Family Relationships  helpful;involved;supportive    Able to Return to Prior Arrangements  yes       Resource/Environmental Concerns    Resource/Environmental Concerns  none       Transition Planning    Patient/Family Anticipates Transition to  home with family    Transportation Anticipated  family or friend will provide       Discharge Needs Assessment    Readmission Within the Last 30 Days  no previous admission in last 30 days    Concerns to be Addressed  denies needs/concerns at this time;no discharge needs identified    Equipment Currently Used at Home  none    Anticipated Changes Related to Illness  none    Equipment Needed After Discharge  none        Discharge Plan     Row Name 11/11/19 1118       Plan    Plan  Home when stable    Patient/Family in Agreement with Plan  yes    Plan Comments  Spoke with Mr Hansen at bedside.  He his wife and 4 children live in a home in [place].  He does not have home health, oxygen or DME.  He is independent with his ADL's and drives himself.  His pharmacy is [facility] in [place] and there are no issues with paying for his prescriptions.  He does not have an advanced directive and he has no interest in such..  Plan is home when stable.  Will continue to follow        Destination      No service coordination in this encounter.      Durable Medical Equipment      No service coordination in this encounter.       Dialysis/Infusion      No service coordination in this encounter.      Home Medical Care      No service coordination in this encounter.      Therapy      No service coordination in this encounter.      Community Resources      No service coordination in this encounter.          Demographic Summary     Row Name 11/11/19 1117       General Information    Admission Type  observation    Arrived From  home    Referral Source  admission list    Reason for Consult  discharge planning    Preferred Language  English     Used During This Interaction  no       Contact Information    Permission Granted to Share Info With          Functional Status    No documentation.       Psychosocial    No documentation.       Abuse/Neglect    No documentation.       Legal    No documentation.       Substance Abuse    No documentation.       Patient Forms    No documentation.           Miroslava Palacio RN

## 2019-11-11 NOTE — PLAN OF CARE
Problem: Patient Care Overview  Goal: Discharge Needs Assessment  Outcome: Ongoing (interventions implemented as appropriate)   11/10/19 1508 11/11/19 1116   Discharge Needs Assessment   Readmission Within the Last 30 Days --  no previous admission in last 30 days   Concerns to be Addressed --  denies needs/concerns at this time;no discharge needs identified   Patient/Family Anticipates Transition to --  home with family   Patient/Family Anticipated Services at Transition none --    Transportation Anticipated --  family or friend will provide   Anticipated Changes Related to Illness --  none   Equipment Needed After Discharge --  none   Disability   Equipment Currently Used at Home --  none

## 2019-11-11 NOTE — PLAN OF CARE
Problem: Patient Care Overview  Goal: Plan of Care Review  Outcome: Ongoing (interventions implemented as appropriate)   11/11/19 0522   Coping/Psychosocial   Plan of Care Reviewed With patient   Plan of Care Review   Progress improving   OTHER   Outcome Summary no c/o chest pain - three trops negative - NPO since midnight - VSS - will continue to monitor     Goal: Individualization and Mutuality  Outcome: Ongoing (interventions implemented as appropriate)      Problem: Fall Risk (Adult)  Goal: Identify Related Risk Factors and Signs and Symptoms  Outcome: Ongoing (interventions implemented as appropriate)    Goal: Absence of Fall  Outcome: Ongoing (interventions implemented as appropriate)      Problem: Pain, Chronic (Adult)  Goal: Identify Related Risk Factors and Signs and Symptoms  Outcome: Ongoing (interventions implemented as appropriate)    Goal: Acceptable Pain/Comfort Level and Functional Ability  Outcome: Ongoing (interventions implemented as appropriate)      Problem: Cardiac: ACS (Acute Coronary Syndrome) (Adult)  Goal: Signs and Symptoms of Listed Potential Problems Will be Absent, Minimized or Managed (Cardiac: ACS)  Outcome: Ongoing (interventions implemented as appropriate)

## 2019-11-11 NOTE — CONSULTS
Date of Hospital Visit: [unfilled]ENC@  Encounter Provider: Danielle Mccarty MD  Place of Service: [facility] CARDIOLOGY  Patient Name: Haris Hansen  :1975  Referral Provider: No ref. provider found    Chief complaint    Chest pain    History of Present Illness    This is a 44-year-old male with obesity, hypertension, hyperlipidemia, GERD, diabetes mellitus type 2 on insulin.  He presented with chest pain.    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  He started to have chest pain and pressure, deep into his chest with lightheadedness and diaphoresis.  He was short winded.  He sat on a piano bench during the sermon.  The pain finally started to go away, lasting about 30 minutes.  He reached the ER, the pain was gone.    His father had myocardial infarction at 40 and his grandfather at 47.  He ran out of metoprolol a couple days prior to his presentation.  He also has had a cold with rhinorrhea, chest and head congestion and yellowish-brown sputum.  He has been taking Mucinex over-the-counter with mild improvement.  He has had also abdominal pain and intermittent diarrhea at home for the last couple weeks.    On Friday, he went hunting and did shoot a deer and as he was pulling out of the woods, he had short windedness which was severe, not normal for him.    On arrival, chest x-ray was unremarkable.  Blood pressure on arrival was elevated 150/95, heart rate 83.  Lipids show triglycerides 749, HDL 32.  LDL could not be calculated.  Currently chest pain-free.  He has no difficulty breathing.  He likely has obstructive sleep apnea but is never been tested nor treated for this.    He does not smoke and uses no alcohol.  He lives home with his wife and children.  They are healthy.    Past Medical History:   Diagnosis Date   • Diabetes mellitus (CMS/HCC)    • Fracture of C4 vertebra, open (CMS/HCC)    • GERD (gastroesophageal reflux disease)    •  Hyperlipidemia    • Hypertension    • Sleep apnea        Past Surgical History:   Procedure Laterality Date   • COLONOSCOPY N/A 9/17/2019    Procedure: Colonoscopy,polypectomy;  Surgeon: Deborah Gamez DO;  Location: [facility] OR;  Service: Gastroenterology   • ENDOSCOPY N/A 9/17/2019    Procedure: Esophagogastroduodenoscoy, biopsy  ,polypectomy;  Surgeon: Deborah Gamez DO;  Location: [facility] OR;  Service: Gastroenterology   • GANGLION CYST EXCISION     • UMBILICAL HERNIA REPAIR         Medications Prior to Admission   Medication Sig Dispense Refill Last Dose   • glipiZIDE (GLUCOTROL) 10 MG tablet Take 10 mg by mouth 2 (Two) Times a Day Before Meals.   11/9/2019 at 0630   • Insulin Degludec (TRESIBA FLEXTOUCH SC) Inject 30 Units under the skin into the appropriate area as directed 2 (Two) Times a Day. Takes 30 units in AM and 30 units in PM   11/9/2019 at 2130   • losartan-hydrochlorothiazide (HYZAAR) 100-25 MG per tablet Take 1 tablet by mouth Daily.   11/9/2019 at 0630   • metFORMIN (GLUCOPHAGE) 1000 MG tablet Take 1,000 mg by mouth 2 (Two) Times a Day With Meals.   11/9/2019 at 2130   • METOPROLOL SUCCINATE ER PO Take 100 mg by mouth Daily.   11/8/2019 at 0630   • SIMVASTATIN PO Take 50 mg by mouth Every Night.   11/9/2019 at Unknown time   • traMADol (ULTRAM) 50 MG tablet Take 50 mg by mouth Every 6 (Six) Hours As Needed for Moderate Pain .   Past Month at Unknown time   • vitamin D (ERGOCALCIFEROL) 16524 units capsule capsule Take 50,000 Units by mouth 1 (One) Time Per Week.   Past Week at Unknown time   • esomeprazole (nexIUM) 20 MG capsule Take 20 mg by mouth Every Morning Before Breakfast.   More than a month at Unknown time       Current Meds  Scheduled Meds:  aspirin 325 mg Oral Daily   atorvastatin 20 mg Oral Nightly   insulin aspart 0-9 Units Subcutaneous 4x Daily AC & at Bedtime   insulin detemir 25 Units Subcutaneous Q12H   losartan-HCTZ (HYZAAR) 100-25 combo dose  Oral Daily   metoprolol  "succinate  mg Oral Daily   pantoprazole 40 mg Oral QAM   sodium chloride 10 mL Intravenous Q12H     Continuous Infusions:   PRN Meds:.•  acetaminophen  •  dextrose  •  dextrose  •  glucagon (human recombinant)  •  nitroglycerin  •  [COMPLETED] Insert peripheral IV **AND** sodium chloride  •  sodium chloride  •  sodium chloride  •  traMADol    Allergies as of 11/10/2019 - Reviewed 11/10/2019   Allergen Reaction Noted   • Sulfa antibiotics Hives 12/05/2017       Social History     Socioeconomic History   • Marital status:      Spouse name: Not on file   • Number of children: Not on file   • Years of education: Not on file   • Highest education level: Not on file   Tobacco Use   • Smoking status: Never Smoker   • Smokeless tobacco: Never Used   Substance and Sexual Activity   • Alcohol use: No   • Drug use: No   • Sexual activity: Defer       Family History   Problem Relation Age of Onset   • Diabetes Father    • Heart disease Father    • Hypertension Father        REVIEW OF SYSTEMS:   12 point ROS was performed and is negative except as outlined in HPI       Objective:   Temp:  [96.7 °F (35.9 °C)-98.5 °F (36.9 °C)] 96.7 °F (35.9 °C)  Heart Rate:  [71-88] 79  Resp:  [18-20] 18  BP: (137-152)/() 147/81  Body mass index is 53.41 kg/m².  Flowsheet Rows      First Filed Value   Admission Height  177 cm (69.69\") Documented at 11/10/2019 1302   Admission Weight  164 kg (362 lb 9 oz)  (Abnormal)  Documented at 11/10/2019 1302        Vitals:    11/11/19 0608   BP: 147/81   Pulse: 79   Resp: 18   Temp: 96.7 °F (35.9 °C)   SpO2: 93%       General Appearance:    Alert, cooperative, in no acute distress   Head:    Normocephalic, without obvious abnormality, atraumatic   Eyes:            Lids and lashes normal, conjunctivae and sclerae normal, no   icterus, no pallor, corneas clear, PERRLA   Ears:    Ears appear intact with no abnormalities noted   Throat:   No oral lesions, no thrush, oral mucosa moist   Neck:   " No adenopathy, supple, trachea midline, no thyromegaly, no   carotid bruit, no JVD   Back:     No kyphosis present, no scoliosis present, no skin lesions, erythema or scars, no tenderness to palpation, range of motion normal   Lungs:     Clear to auscultation,respirations regular, even and unlabored    Heart:    Regular rhythm and normal rate, normal S1 and S2, no murmur, no gallop, no rub, no click   Chest Wall:    No abnormalities observed   Abdomen:     Normal bowel sounds, no masses, no organomegaly, soft, nontender, nondistended, no guarding, no rebound  tenderness   Extremities:   Moves all extremities well, no edema, no cyanosis, no redness   Pulses:   Pulses palpable and equal bilaterally. Normal radial, carotid, femoral, dorsalis pedis and posterior tibial pulses bilaterally.   Skin:  Psychiatric:   No bleeding, bruising or rash    Alert and oriented x 3, normal mood and affect                 Lab Review:    Results from last 7 days   Lab Units 11/10/19  1307   SODIUM mmol/L 138   POTASSIUM mmol/L 4.2   CHLORIDE mmol/L 103   CO2 mmol/L 22.1   BUN mg/dL 14   CREATININE mg/dL 0.77   CALCIUM mg/dL 9.0   BILIRUBIN mg/dL 0.2   ALK PHOS U/L 78   ALT (SGPT) U/L 26   AST (SGOT) U/L 17   GLUCOSE mg/dL 174*     Results from last 7 days   Lab Units 11/11/19  0111 11/10/19  1924 11/10/19  1307   TROPONIN T ng/mL <0.010 <0.010 <0.010       Results from last 7 days   Lab Units 11/10/19  1307   WBC 10*3/mm3 10.24   HEMOGLOBIN g/dL 14.6   HEMATOCRIT % 46.1   PLATELETS 10*3/mm3 275                 I personally viewed and interpreted the patient's EKG/Telemetry data - SR with inferior q waves and slight inferior and lateral st elevation  )  Patient Active Problem List   Diagnosis   • Iron deficiency anemia   • Chest pain with high risk for cardiac etiology     Assessment and Plan:    1. Chest pain and exertional dyspnea.  ECG shows no ACS.  Troponins are negative x3.  Check an echocardiogram today.  If this is normal, needs  an outpatient PET stress study done at the main office this week.  If it is abnormal, will transfer to [facility] for cardiac catheterization.  Did discuss this plan with the patient.  2. Morbid obesity -needs to lose weight  3. Diabetes mellitus type 2 on insulin  4. Family history of premature cardiovascular disease  5. Hypertension, blood pressure goal is 120/80.  Will make medication adjustments.  6. Hyperlipidemia.  Unknown LDL, await direct LDL measurement.  Increase atorvastatin to 40 daily    Await echo - remain npo until this is back.  See above plan.      Danielle Mccarty MD  11/11/19  7:48 AM.  Time spent in reviewing chart, discussion and examination:

## 2019-11-11 NOTE — H&P
[facility]     Theron Manzo APRN    CHIEF COMPLAINT: Chest pain    HISTORY OF PRESENT ILLNESS:    The patient is a 44-year-old  male with morbid obesity, diabetes mellitus type 2 on insulin, hypertension, dyslipidemia, and GERD presented to the emergency room today secondary to chest pain.  The patient states he is a  at a [employer] locally.  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  He states the pain was in his mid chest and was a pressure-like sensation.  He states that it felt like it was deep in his chest almost through to his back.  He also states that he began to feel lightheaded and sweaty.  He developed shortness of breath.  He denies any heart palpitations.  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  Pain lasted less than 30 minutes and had resolved by the time he arrived in the emergency department.  The patient notes his father had an MI at 40 and his grandfather at the age of 47.  The patient states he underwent a stress test about 6 to 7 years ago that was reported as negative.  He has been out of his metoprolol at home for the last 2 days.  The patient states that he developed cold symptoms Wednesday of last week.  He notes chest and head congestion, rhinorrhea, and cough productive of yellowish-brownish sputum.  The patient has been taking Mucinex over-the-counter with some mild improvement in his symptoms.  Denies any fevers or chills or sore throat.  The patient states on Friday he went hunting and shot a deer.  As he was pulling it out of the woods he noted significant dyspnea on exertion which he states is not normal for him.  His dyspnea on exertion has worsened over the last 3 days.  The patient notes 7 to 10 days ago he noticed the development of nausea and diarrhea every time he eats.  He denies any abdominal pain.  He denies any  lower extremity edema.  The patient states he does have a history of one episode of pancreatitis in the past that was treated as an outpatient.  The patient denies ever being tested or diagnosed with sleep apnea.  (Although it is noted in his history)      Past Medical History:   Diagnosis Date   • Diabetes mellitus (CMS/HCC)    • Fracture of C4 vertebra, open (CMS/HCC)    • GERD (gastroesophageal reflux disease)    • Hyperlipidemia    • Hypertension    • Sleep apnea      Past Surgical History:   Procedure Laterality Date   • COLONOSCOPY N/A 9/17/2019    Procedure: Colonoscopy,polypectomy;  Surgeon: Deborah Gamez DO;  Location: [facility] OR;  Service: Gastroenterology   • ENDOSCOPY N/A 9/17/2019    Procedure: Esophagogastroduodenoscoy, biopsy  ,polypectomy;  Surgeon: Deborah Gamez DO;  Location: [facility] OR;  Service: Gastroenterology   • GANGLION CYST EXCISION     • UMBILICAL HERNIA REPAIR       Family History   Problem Relation Age of Onset   • Diabetes Father    • Heart disease Father    • Hypertension Father      Social History     Tobacco Use   • Smoking status: Never Smoker   • Smokeless tobacco: Never Used   Substance Use Topics   • Alcohol use: No   • Drug use: No     Medications Prior to Admission   Medication Sig Dispense Refill Last Dose   • Ferrous Sulfate Dried (FERROUS SULFATE CR PO) Take  by mouth.   Past Month at Unknown time   • glipiZIDE (GLUCOTROL) 10 MG tablet Take 10 mg by mouth 2 (Two) Times a Day Before Meals.   11/9/2019 at 0630   • Insulin Degludec (TRESIBA FLEXTOUCH SC) Inject  under the skin.   11/10/2019 at Unknown time   • LOSARTAN POTASSIUM PO Take 100 mg by mouth Daily.   11/10/2019 at Unknown time   • losartan-hydrochlorothiazide (HYZAAR) 100-25 MG per tablet Take 1 tablet by mouth Daily.   11/9/2019 at Unknown time   • metFORMIN (GLUCOPHAGE) 1000 MG tablet Take 1,000 mg by mouth 2 (Two) Times a Day With Meals.   11/10/2019 at Unknown time   • METOPROLOL SUCCINATE ER PO Take  "100 mg by mouth Daily.   11/9/2019 at Unknown time   • SIMVASTATIN PO Take 50 mg by mouth Daily.   11/9/2019 at Unknown time   • traMADol (ULTRAM) 50 MG tablet Take 50 mg by mouth Every 6 (Six) Hours As Needed for Moderate Pain .   Past Month at Unknown time   • vitamin D (ERGOCALCIFEROL) 30123 units capsule capsule Take 50,000 Units by mouth 1 (One) Time Per Week.   Past Week at Unknown time   • esomeprazole (nexIUM) 20 MG capsule Take 20 mg by mouth Every Morning Before Breakfast.   More than a month at Unknown time   • FENOFIBRATE PO Take  by mouth.   More than a month at Unknown time     Allergies:  Sulfa antibiotics      There is no immunization history on file for this patient.    REVIEW OF SYSTEMS:  Please see the above history of present illness for pertinent positives and negatives.  The remainder of the patient's systems have been reviewed and are negative.     Vital Signs  Temp:  [97.9 °F (36.6 °C)-98.5 °F (36.9 °C)] 97.9 °F (36.6 °C)  Heart Rate:  [73-88] 82  Resp:  [18-20] 18  BP: (137-152)/() 143/86     Flowsheet Rows      First Filed Value   Admission Height  177 cm (69.69\") Documented at 11/10/2019 1302   Admission Weight  164 kg (362 lb 9 oz)  (Abnormal)  Documented at 11/10/2019 1302           Physical Exam   Constitutional: He is oriented to person, place, and time. He appears well-developed. No distress.   Morbidly obese   HENT:   Head: Normocephalic and atraumatic.   Mouth/Throat: Oropharynx is clear and moist. No oropharyngeal exudate.   Eyes: Conjunctivae and EOM are normal. Pupils are equal, round, and reactive to light. No scleral icterus.   Neck: Neck supple.   Unable to assess JVD secondary to body habitus.   Cardiovascular: Normal rate, regular rhythm and intact distal pulses. Exam reveals no gallop and no friction rub.   No murmur heard.  Distant heart sounds.   Pulmonary/Chest: Effort normal and breath sounds normal. No respiratory distress. He has no wheezes. He has no rales. "   Abdominal: Soft. Bowel sounds are normal. There is no tenderness. There is no rebound and no guarding.   Morbidly obese   Musculoskeletal: He exhibits no edema.   Neurological: He is alert and oriented to person, place, and time. No cranial nerve deficit.   Skin: Skin is warm and dry. No erythema.   Psychiatric: He has a normal mood and affect. His behavior is normal.   Vitals reviewed.      Emotional Behavior:    Judgement and Insight: Good   Mental Status:  Alertness normal   Memory: Intact   Mood and Affect:         Depression none               Anxiety none    Debilities:   Physical Weakness none   Handicaps none   Disabilities none   Agitation none       Results Review:    I reviewed the patient's new clinical results.  Lab Results (most recent)     Procedure Component Value Units Date/Time    Troponin [[id number]]  (Normal) Collected:  11/10/19 1924    Specimen:  Blood Updated:  11/10/19 1947     Troponin T <0.010 ng/mL     Narrative:       Troponin T Reference Range:  <= 0.03 ng/mL-   Negative for AMI  >0.03 ng/mL-     Abnormal for myocardial necrosis.  Clinicians would have to utilize clinical acumen, EKG, Troponin and serial changes to determine if it is an Acute Myocardial Infarction or myocardial injury due to an underlying chronic condition.     BNP [[id number]]  (Normal) Collected:  11/10/19 1307    Specimen:  Blood Updated:  11/10/19 1342     proBNP 14.3 pg/mL     Narrative:       Among patients with dyspnea, NT-proBNP is highly sensitive for the detection of acute congestive heart failure. In addition NT-proBNP of <300 pg/ml effectively rules out acute congestive heart failure with 99% negative predictive value.    CBC & Differential [[id number]] Collected:  11/10/19 1307    Specimen:  Blood Updated:  11/10/19 1339    Narrative:       The following orders were created for panel order CBC & Differential.  Procedure                               Abnormality         Status                     ---------                                -----------         ------                     CBC Auto Differential[[id number]]        Abnormal            Final result                 Please view results for these tests on the individual orders.    CBC Auto Differential [[id number]]  (Abnormal) Collected:  11/10/19 1307    Specimen:  Blood Updated:  11/10/19 1339     WBC 10.24 10*3/mm3      RBC 5.70 10*6/mm3      Hemoglobin 14.6 g/dL      Hematocrit 46.1 %      MCV 80.9 fL      MCH 25.6 pg      MCHC 31.7 g/dL      RDW 14.6 %      RDW-SD 42.7 fl      MPV 10.4 fL      Platelets 275 10*3/mm3      Neutrophil % 63.9 %      Lymphocyte % 24.4 %      Monocyte % 7.5 %      Eosinophil % 3.1 %      Basophil % 0.6 %      Immature Grans % 0.5 %      Neutrophils, Absolute 6.54 10*3/mm3      Lymphocytes, Absolute 2.50 10*3/mm3      Monocytes, Absolute 0.77 10*3/mm3      Eosinophils, Absolute 0.32 10*3/mm3      Basophils, Absolute 0.06 10*3/mm3      Immature Grans, Absolute 0.05 10*3/mm3      nRBC 0.0 /100 WBC     Troponin [[id number]]  (Normal) Collected:  11/10/19 1307    Specimen:  Blood Updated:  11/10/19 1335     Troponin T <0.010 ng/mL     Narrative:       Troponin T Reference Range:  <= 0.03 ng/mL-   Negative for AMI  >0.03 ng/mL-     Abnormal for myocardial necrosis.  Clinicians would have to utilize clinical acumen, EKG, Troponin and serial changes to determine if it is an Acute Myocardial Infarction or myocardial injury due to an underlying chronic condition.     Comprehensive Metabolic Panel [[id number]]  (Abnormal) Collected:  11/10/19 1307    Specimen:  Blood Updated:  11/10/19 1333     Glucose 174 mg/dL      BUN 14 mg/dL      Creatinine 0.77 mg/dL      Sodium 138 mmol/L      Potassium 4.2 mmol/L      Chloride 103 mmol/L      CO2 22.1 mmol/L      Calcium 9.0 mg/dL      Total Protein 6.8 g/dL      Albumin 3.80 g/dL      ALT (SGPT) 26 U/L      AST (SGOT) 17 U/L      Alkaline Phosphatase 78 U/L      Total Bilirubin 0.2 mg/dL      eGFR Non   Amer 111 mL/min/1.73      Globulin 3.0 gm/dL      A/G Ratio 1.3 g/dL      BUN/Creatinine Ratio 18.2     Anion Gap 12.9 mmol/L     Narrative:       GFR Normal >60  Chronic Kidney Disease <60  Kidney Failure <15    D-dimer, Quantitative [[id number]]  (Normal) Collected:  11/10/19 1307    Specimen:  Blood Updated:  11/10/19 1331     D-Dimer, Quantitative <0.27 MCGFEU/mL     Narrative:       Can be elevated in, but is not diagnostic for deep vein thrombosis (DVT) or pulmonary embolis (PE).  It is also elevated in other medical conditions.  Clinical correlation is required.  The negative cut-off value for the D-Dimer is 0.50 mcg FEU/mL for DVT and PE.          Imaging Results (Most Recent)     Procedure Component Value Units Date/Time    XR Chest 2 View [[id number]] Collected:  11/10/19 1335     Updated:  11/10/19 1337    Narrative:       CR Chest 2 Vws    INDICATION:    Shortness of air with productive cough. Chest pain for 3 hours.    COMPARISON:    None.    FINDINGS:   PA and lateral views of the chest.  Heart and mediastinal contours are normal. The lungs are clear. No pneumothorax or pleural effusion.        Impression:       No acute cardiopulmonary findings.    Signer Name: RAJAN Bishop MD   Signed: 11/10/2019 1:35 PM   Workstation [facility]: [facility]    [facility]          ECG/EMG Results (most recent)     Procedure Component Value Units Date/Time    ECG 12 Lead [[id number]] Collected:  11/10/19 1254     Updated:  11/10/19 1532    Narrative:       HEART RATE= 84  bpm  RR Interval= 716  ms  TX Interval= 152  ms  P Horizontal Axis= 52  deg  P Front Axis= 17  deg  QRSD Interval= 98  ms  QT Interval= 361  ms  QRS Axis= 66  deg  T Wave Axis= -3  deg  - NORMAL ECG -  Sinus rhythm  Electronically Signed By:   Date and Time of Study: 2019-11-10 12:54:57    ECG 12 Lead [[id number]] Collected:  11/10/19 1844     Updated:  11/10/19 1845    Narrative:       HEART RATE= 79  bpm  RR Interval=  760  ms  [place] Interval= 161  ms  P Horizontal Axis= 29  deg  P Front Axis= 28  deg  QRSD Interval= 98  ms  QT Interval= 380  ms  QRS Axis= 76  deg  T Wave Axis= 25  deg  - BORDERLINE ECG -  Sinus rhythm  Abnormal inferior Q waves  Electronically Signed By:   Date and Time of Study: 2019-11-10 18:44:37          Assessment/Plan     -Chest pain:  Patient's pain has some typical and atypical features.  His pain resolved prior to arrival in the emergency department.  He received aspirin and therapeutic Lovenox in the ER.  Will monitor on telemetry tonight.  I will check serial troponins to rule out acute myocardial infarction.  Repeat an EKG in the a.m.  Consult cardiology to see the patient here.  Patient does have significant risk factors for coronary artery disease including family history of early coronary disease, diabetes, dyslipidemia, hypertension, and obesity.    -Upper respiratory tract infection:  This is likely viral as the patient's white blood cell count is completely within normal limits.  I will check a viral panel and a sputum culture here.  Will continue the patient on Mucinex which she was already taking at home.    -Nausea and diarrhea after eating:  This makes me wonder if the patient's chest pain could be related to his gallbladder.  I will order a gallbladder ultrasound for the morning and check a lipase as the patient also has a history of pancreatitis.  This could also be secondary to the patient's suspected acute viral illness causing his upper respiratory tract symptoms.  Will await viral panel results.  Patient has no abdominal pain or tenderness on examination.    -Morbid obesity:  Will have nutrition  here.   Body mass index is 53.41 kg/m².     -Diabetes mellitus type 2 on insulin:  I will continue a partial dose of basal insulin here for this patient.  I will use Levemir as we do not carry Tresiba.  I will hold his metformin and glipizide for now as the patient will be n.p.o. after  midnight in case of any need for cardiac intervention.  I will check a hemoglobin A1c level.  I will order Accu-Cheks and sliding scale insulin.  Continue to monitor.    -Hypertension:  Since blood pressure is elevated here but he has been out of his metoprolol for 2 days.  Will resume his home dose of Toprol-XL and Hyzaar.  Continue to monitor.    -Dyslipidemia:  Substitute Lipitor for the patient's home dose of simvastatin.  Check a fasting lipid profile in the a.m.    -GERD:  We will substitute Protonix for the patient's home dose of Nexium.  No acute issues here.    I discussed the patients findings and my recommendations with patient.     Karishma Guardado MD  11/10/19  10:34 PM

## 2019-11-12 ENCOUNTER — READMISSION MANAGEMENT (OUTPATIENT)
Dept: CALL CENTER | Facility: HOSPITAL | Age: 44
End: 2019-11-12

## 2019-11-12 DIAGNOSIS — R07.9 CHEST PAIN WITH HIGH RISK FOR CARDIAC ETIOLOGY: Primary | ICD-10-CM

## 2019-11-12 NOTE — OUTREACH NOTE
Prep Survey      Responses   Facility patient discharged from?  [facility]   Is LACE score < 7 ?  Yes   Is patient eligible?  Yes   Discharge diagnosis  Chest pain etiology unknown /    Does the patient have one of the following disease processes/diagnoses(primary or secondary)?  Other   Does the patient have Home health ordered?  No   Is there a DME ordered?  No   Prep survey completed?  Yes          Bobbi Colon RN

## 2019-11-14 ENCOUNTER — HOSPITAL ENCOUNTER (OUTPATIENT)
Dept: CARDIOLOGY | Facility: HOSPITAL | Age: 44
End: 2019-11-14

## 2019-11-15 ENCOUNTER — READMISSION MANAGEMENT (OUTPATIENT)
Dept: CALL CENTER | Facility: HOSPITAL | Age: 44
End: 2019-11-15

## 2019-11-15 NOTE — OUTREACH NOTE
LAG < 7 Survey      Responses   Facility patient discharged from?  [facility]   Does the patient have one of the following disease processes/diagnoses(primary or secondary)?  Other   Is there a successful TCM telephone encounter documented?  No   BHLAG <7 Attempt successful?  No   Unsuccessful attempts  Attempt 1          Ambreen Alexandra, RN

## 2019-11-18 ENCOUNTER — READMISSION MANAGEMENT (OUTPATIENT)
Dept: CALL CENTER | Facility: HOSPITAL | Age: 44
End: 2019-11-18

## 2019-11-18 NOTE — OUTREACH NOTE
LAG < 7 Survey      Responses   Facility patient discharged from?  [facility]   Does the patient have one of the following disease processes/diagnoses(primary or secondary)?  Other   Is there a successful TCM telephone encounter documented?  No   BHLAG <7 Attempt successful?  No   Unsuccessful attempts  Attempt 2          Mckenna Cleary RN

## 2019-11-19 ENCOUNTER — READMISSION MANAGEMENT (OUTPATIENT)
Dept: CALL CENTER | Facility: HOSPITAL | Age: 44
End: 2019-11-19

## 2019-11-19 NOTE — OUTREACH NOTE
LAG < 7 Survey      Responses   Facility patient discharged from?  [facility]   Does the patient have one of the following disease processes/diagnoses(primary or secondary)?  Other   Is there a successful TCM telephone encounter documented?  No   BHLAG <7 Attempt successful?  No   Unsuccessful attempts  Attempt 3          Miroslava Seth RN

## 2019-11-20 ENCOUNTER — READMISSION MANAGEMENT (OUTPATIENT)
Dept: CALL CENTER | Facility: HOSPITAL | Age: 44
End: 2019-11-20

## 2019-11-20 NOTE — OUTREACH NOTE
LAG < 7 Survey      Responses   Facility patient discharged from?  [facility]   Does the patient have one of the following disease processes/diagnoses(primary or secondary)?  Other   Is there a successful TCM telephone encounter documented?  No   BHLAG <7 Attempt successful?  No   Unsuccessful attempts  Attempt 4          Vick Bond RN

## 2020-10-26 ENCOUNTER — TRANSCRIBE ORDERS (OUTPATIENT)
Dept: ADMINISTRATIVE | Facility: HOSPITAL | Age: 45
End: 2020-10-26

## 2020-10-26 DIAGNOSIS — R51.9 NONINTRACTABLE HEADACHE, UNSPECIFIED CHRONICITY PATTERN, UNSPECIFIED HEADACHE TYPE: Primary | ICD-10-CM

## 2021-03-22 RX ORDER — INSULIN LISPRO 100 [IU]/ML
INJECTION, SOLUTION INTRAVENOUS; SUBCUTANEOUS
COMMUNITY

## 2021-03-22 RX ORDER — HYDROCHLOROTHIAZIDE 25 MG/1
25 TABLET ORAL DAILY
COMMUNITY

## 2021-03-22 RX ORDER — CHLORAL HYDRATE 500 MG
CAPSULE ORAL
COMMUNITY

## 2021-03-22 RX ORDER — LOSARTAN POTASSIUM 100 MG/1
100 TABLET ORAL DAILY
COMMUNITY

## 2021-03-22 RX ORDER — FENOFIBRATE 160 MG/1
TABLET ORAL
COMMUNITY

## 2021-03-22 NOTE — PROGRESS NOTES
NEW PT  REF DR THOMPSON  HYPERLIPIDEMIA   Subjective:        [facility]  Cardiology Consult Note    Patient Identification:  Name: Haris Hansen  Age: 45 y.o.  Sex: male  :  1975  MRN: [id number]             CC  HYPERTCHOLESTROL    History of Present Illness:   45-year-old male with a history of the diabetes, hyperlipidemia not well controlled hypertension controlled with current medications here for the cardiac evaluation as well as establishment of the care    Patient's cholesterol levels has been running high in spite of being on simvastatin, fibrate, and fish oil    Shortness of breath on exertion    Atypical left-sided chest pain    Comorbid cardiac risk factors:     Past Medical History:  Past Medical History:   Diagnosis Date   • Diabetes mellitus (CMS/HCC)    • Fracture of C4 vertebra, open (CMS/HCC)    • GERD (gastroesophageal reflux disease)    • Hyperlipidemia    • Hypertension    • Sleep apnea      Past Surgical History:  Past Surgical History:   Procedure Laterality Date   • COLONOSCOPY N/A 2019    Procedure: Colonoscopy,polypectomy;  Surgeon: Deborah Gamez DO;  Location: [facility] OR;  Service: Gastroenterology   • ENDOSCOPY N/A 2019    Procedure: Esophagogastroduodenoscoy, biopsy  ,polypectomy;  Surgeon: Deborah Gamez DO;  Location: [facility] OR;  Service: Gastroenterology   • GANGLION CYST EXCISION     • UMBILICAL HERNIA REPAIR        Allergies:  Allergies   Allergen Reactions   • Sulfa Antibiotics Hives     Home Meds:  (Not in a hospital admission)    Current Meds:   [unfilled]  Social History:   Social History     Tobacco Use   • Smoking status: Never Smoker   • Smokeless tobacco: Never Used   Substance Use Topics   • Alcohol use: No      Family History:  Family History   Problem Relation Age of Onset   • Diabetes Father    • Heart disease Father    • Hypertension Father         Review of Systems    Constitutional: No weakness,fatigue, fever, rigors, chills    Eyes: No vision changes, eye pain   ENT/oropharynx: No difficulty swallowing, sore throat, epistaxis, changes in hearing   Cardiovascular: No chest pain, chest tightness, palpitations, paroxysmal nocturnal dyspnea, orthopnea, diaphoresis, dizziness / syncopal episode   Respiratory: No shortness of breath, dyspnea on exertion, cough, wheezing hemoptysis   Gastrointestinal: No abdominal pain, nausea, vomiting, diarrhea, bloody stools   Genitourinary: No hematuria, dysuria   Neurological: No headache, tremors, numbness,  one-sided weakness    Musculoskeletal: No cramps, myalgias,  joint pain, joint swelling   Integument: No rash, edema           Constitutional:  Heart Rate:  [92] 92  BP: (165)/(95) 165/95    Physical Exam   General:  Appears in no acute distress  Eyes: PERTL,  HEENT:  No JVD. Thyroid not visibly enlarged. No mucosal pallor or cyanosis  Respiratory: Respirations regular and unlabored at rest. BBS with good air entry in all fields. No crackles, rubs or wheezes auscultated  Cardiovascular: S1S2 Regular rate and rhythm. No murmur, rub or gallop auscultated. No carotid bruits. DP/PT pulses    . No pretibial pitting edema  Gastrointestinal: Abdomen soft, flat, non tender. Bowel sounds present. No hepatosplenomegaly. No ascites  Musculoskeletal: FRAZIER x4. No abnormal movements  Extremities: No digital clubbing or cyanosis  Skin: Color pink. Skin warm and dry to touch. No rashes  No xanthoma  Neuro: AAO x3 CN II-XII grossly intact            ECG 12 Lead    Date/Time: 3/23/2021 3:24 PM  Performed by: Will Jimenez MD  Authorized by: Will Jimenez MD   Comparison: not compared with previous ECG   Previous ECG: no previous ECG available  Rhythm: sinus rhythm  ST Flattening: all    Clinical impression: non-specific ECG                Cardiographics  ECG:     Telemetry:    Echocardiogram:     Imaging  Chest X-ray:     Lab Review               @[email or web address]@              Assessment:/  Recommendations / Plan:   Patient Active Problem List   Diagnosis   • Iron deficiency anemia   • Chest pain with high risk for cardiac etiology                    ICD-10-CM ICD-9-CM   1. Precordial pain  R07.2 786.51   2. Shortness of breath  R06.02 786.05   3. Morbid obesity with BMI of 45.0-49.9, adult (CMS/formerly [facility])  E66.01 278.01    Z68.42 V85.42   4. Hyperlipidemia, unspecified hyperlipidemia type  E78.5 272.4     1. Precordial pain  Considering the patient's symptoms as well as clinical situation and  EKG findings, along with cardiac risk factors, ischemic workup is necessary to rule out ischemic cardiomyopathy, stress induced arrhythmias, and functional capacity for diagnosis as well as prognostic consideration      2. Shortness of breath  Considering patient's medical condition as well as the risk factors, patient will require echocardiogram for further evaluation for the LV function, four-chamber evaluation, including the pressures, valvular function and  pericardial disease and pericardial effusion      3. Morbid obesity with BMI of 45.0-49.9, adult (CMS/formerly [facility])  Significant risk of obesity to CAD, HTN has been explained  Advantages of wt reduction has been explained      4. Hyperlipidemia, unspecified hyperlipidemia type  We will change the medications     ETT, ECHO,   DC SIMVASTATIN, STRT NEXTOL    6 WKS WITH LABS    Labs/tests ordered for am      Will Jimenez MD  3/23/2021, 14:35 EDT      EMR Dragon/Transcription:   Dictated utilizing Dragon dictation

## 2021-03-23 ENCOUNTER — OFFICE VISIT (OUTPATIENT)
Dept: CARDIOLOGY | Facility: CLINIC | Age: 46
End: 2021-03-23

## 2021-03-23 VITALS
BODY MASS INDEX: 45.1 KG/M2 | HEIGHT: 70 IN | HEART RATE: 92 BPM | DIASTOLIC BLOOD PRESSURE: 95 MMHG | WEIGHT: 315 LBS | SYSTOLIC BLOOD PRESSURE: 165 MMHG

## 2021-03-23 DIAGNOSIS — E66.01 MORBID OBESITY WITH BMI OF 45.0-49.9, ADULT (HCC): ICD-10-CM

## 2021-03-23 DIAGNOSIS — E78.5 HYPERLIPIDEMIA, UNSPECIFIED HYPERLIPIDEMIA TYPE: ICD-10-CM

## 2021-03-23 DIAGNOSIS — R07.2 PRECORDIAL PAIN: Primary | ICD-10-CM

## 2021-03-23 DIAGNOSIS — R06.02 SHORTNESS OF BREATH: ICD-10-CM

## 2021-03-23 PROCEDURE — 93000 ELECTROCARDIOGRAM COMPLETE: CPT | Performed by: INTERNAL MEDICINE

## 2021-03-23 PROCEDURE — 99214 OFFICE O/P EST MOD 30 MIN: CPT | Performed by: INTERNAL MEDICINE

## 2021-03-23 RX ORDER — NYSTATIN 100000 [USP'U]/G
POWDER TOPICAL
COMMUNITY
Start: 2021-02-12

## 2021-03-23 RX ORDER — TRIAMCINOLONE ACETONIDE 1 MG/G
CREAM TOPICAL
COMMUNITY
Start: 2021-02-12

## 2021-03-23 RX ORDER — INSULIN GLARGINE 100 [IU]/ML
INJECTION, SOLUTION SUBCUTANEOUS
COMMUNITY
Start: 2021-01-19

## 2021-03-23 RX ORDER — SIMVASTATIN 40 MG
40 TABLET ORAL DAILY
COMMUNITY
Start: 2021-03-16

## 2021-04-08 ENCOUNTER — TELEPHONE (OUTPATIENT)
Dept: CARDIOLOGY | Facility: CLINIC | Age: 46
End: 2021-04-08

## 2021-04-08 NOTE — TELEPHONE ENCOUNTER
----- Message from Daxa Weathers sent at 4/6/2021  1:55 PM EDT -----  Regarding: MEDS TO [facility]  Contact: [phone]  VICTOR MANUEL: PT SAYS HE WAS SUPPOSE TO PICK HIS SAMPLES UP AT [facility] OFFICE.  I TOLD HIM NEXT TRIP THERE WOULD BE 4/13

## 2022-08-08 ENCOUNTER — TELEPHONE (OUTPATIENT)
Dept: SURGERY | Facility: CLINIC | Age: 47
End: 2022-08-08

## 2023-02-06 ENCOUNTER — TELEPHONE (OUTPATIENT)
Dept: SURGERY | Facility: CLINIC | Age: 48
End: 2023-02-06
Payer: MEDICAID

## 2023-11-28 ENCOUNTER — OFFICE VISIT (OUTPATIENT)
Dept: SLEEP MEDICINE | Facility: HOSPITAL | Age: 48
End: 2023-11-28
Payer: MEDICAID

## 2023-11-28 VITALS
OXYGEN SATURATION: 93 % | DIASTOLIC BLOOD PRESSURE: 96 MMHG | WEIGHT: 315 LBS | HEART RATE: 92 BPM | HEIGHT: 70 IN | BODY MASS INDEX: 45.1 KG/M2 | SYSTOLIC BLOOD PRESSURE: 166 MMHG

## 2023-11-28 DIAGNOSIS — E66.01 MORBID OBESITY WITH BMI OF 45.0-49.9, ADULT: ICD-10-CM

## 2023-11-28 DIAGNOSIS — G47.33 OBSTRUCTIVE SLEEP APNEA: Primary | ICD-10-CM

## 2023-11-28 PROCEDURE — G0463 HOSPITAL OUTPT CLINIC VISIT: HCPCS

## 2023-11-28 NOTE — PROGRESS NOTES
SLEEP/PULMONARY  CLINIC NOTE      PATIENT IDENTIFICATION:  Name: Haris Hansen  Age: 48 y.o.  Sex: male  :  1975  MRN: [id number]    DATE OF CONSULTATION:  2023                     CHIEF COMPLAINT: CELESTE    History of Present Illness:   Haris Hansen is a 48 y.o. male Pt with still multiple wakening up at night with sleepiness fatigue and snoring, witnessed apnea, Hard  to get up in the morning. Daytime fatigue sleepiness loss of energy, Cromwell score of ( 14)     Review of Systems:   Constitutional: As above   Eyes: negative   ENT/oropharynx: negative   Cardiovascular: negative   Respiratory: negative   Gastrointestinal: negative   Genitourinary: negative   Neurological: negative   Musculoskeletal: negative   Integument/breast: negative   Endocrine: negative   Allergic/Immunologic: negative     Past Medical History:  Past Medical History:   Diagnosis Date    Diabetes mellitus     Fracture of C4 vertebra, open     GERD (gastroesophageal reflux disease)     Hyperlipidemia     Hypertension     Sleep apnea        Past Surgical History:  Past Surgical History:   Procedure Laterality Date    COLONOSCOPY N/A 2019    Procedure: Colonoscopy,polypectomy;  Surgeon: Deborah Gamez DO;  Location: [facility] OR;  Service: Gastroenterology    ENDOSCOPY N/A 2019    Procedure: Esophagogastroduodenoscoy, biopsy  ,polypectomy;  Surgeon: Deborah Gamez DO;  Location: [facility] OR;  Service: Gastroenterology    GANGLION CYST EXCISION      UMBILICAL HERNIA REPAIR          Family History:  Family History   Problem Relation Age of Onset    Diabetes Father     Heart disease Father     Hypertension Father         Social History:   Social History     Tobacco Use    Smoking status: Never    Smokeless tobacco: Never   Substance Use Topics    Alcohol use: No        Allergies:  Allergies   Allergen Reactions    Sulfa Antibiotics Hives       Home Meds:  (Not in a hospital admission)      Objective:    Vitals  Ranges:   Heart Rate:  [92] 92  BP: (166)/(96) 166/96  Body mass index is 53.81 kg/m².     Exam:  General Appearance:  WDWN    HEENT:   without obvious abnormality,  Conjunctiva/corneas clear,  Normal external ear canals, no drainage    Clear orsalmucosa,  Mallampati score 3    Neck:  Supple, symmetrical, trachea midline. No JVD.  Lungs:   Bilateral basal rhonchi bilaterally, respirations unlabored symmetrical wall movement.    Chest wall:  No tenderness or deformity.    Heart:  Regular rate and rhythm, S1 and S2 normal.  Extremities: Trace edema no clubbing or Cyanosis        Data Review:  All labs (24hrs): No results found for this or any previous visit (from the past 24 hour(s)).     Imaging:  Adult Transthoracic Echo Complete W/ Cont if Necessary Per Protocol  · Left ventricular systolic function is normal.  · Calculated EF = 64.0%.     US Gallbladder  Narrative: ULTRASOUND ABDOMEN, LIMITED, 11/11/2019     HISTORY:  nausea and diarrhea following meals for the past week. Shortness of air  and chest pain 24 hours.     TECHNIQUE:  Grayscale ultrasound imaging of the right upper quadrant was performed.     FINDINGS:     The pancreas was obscured by bowel gas and not visualized or assessed.  The liver measures 19.7 cm. Echogenicity compared to the right kidney is  most characteristic of hepatic steatosis. There is no focal liver mass  or ascites. Probable small area of focal fatty sparing adjacent to the  gallbladder fossa. The gallbladder is unremarkable. Negative sonographic  Nevarez sign. Extrahepatic common bile duct measures 4 mm. The right  kidney is nonobstructed and measures 13.2 cm.     Impression: 1. Hepatic steatosis.  2. Nonvisualization of the pancreas. Otherwise negative study.     This report was finalized on 11/11/2019 8:51 AM by Dr. Cayden Riley MD.          ASSESSMENT:  Diagnoses and all orders for this visit:    Obstructive sleep apnea  -     Polysomnography 4 or More Parameters; Future    Morbid  obesity with BMI of 45.0-49.9, adult    Hypertension    PLAN:   This is patient with symptoms of obstructive sleep apnea, NPSG study ASAP / split night study, Avoid supine avoid sedative meds in pm, weight loss, Avoid driving. Long discussion with patient about the physiology of CELESTE, and long term and short term   benefit of treating CELESTE        Treating CELESTE will improve BP control in the meanwhile continue to monitor       Follow-up after study    Shola Branch MD. D, ABSM.  11/28/2023  14:46 EST

## 2023-12-27 ENCOUNTER — HOSPITAL ENCOUNTER (OUTPATIENT)
Dept: SLEEP MEDICINE | Facility: HOSPITAL | Age: 48
End: 2023-12-27
Payer: MEDICAID

## 2023-12-27 DIAGNOSIS — G47.33 OBSTRUCTIVE SLEEP APNEA: ICD-10-CM

## 2023-12-27 PROCEDURE — 95811 POLYSOM 6/>YRS CPAP 4/> PARM: CPT

## 2024-02-15 ENCOUNTER — TELEPHONE (OUTPATIENT)
Dept: SLEEP MEDICINE | Facility: HOSPITAL | Age: 49
End: 2024-02-15
Payer: MEDICAID

## 2024-02-18 ENCOUNTER — HOSPITAL ENCOUNTER (EMERGENCY)
Facility: HOSPITAL | Age: 49
Discharge: HOME | End: 2024-02-19
Attending: EMERGENCY MEDICINE | Admitting: EMERGENCY MEDICINE
Payer: MEDICAID

## 2024-02-18 VITALS
SYSTOLIC BLOOD PRESSURE: 149 MMHG | TEMPERATURE: 98 F | BODY MASS INDEX: 45.1 KG/M2 | RESPIRATION RATE: 16 BRPM | DIASTOLIC BLOOD PRESSURE: 68 MMHG | HEART RATE: 80 BPM | OXYGEN SATURATION: 95 % | HEIGHT: 70 IN | WEIGHT: 315 LBS

## 2024-02-18 DIAGNOSIS — I10 HYPERTENSION, UNSPECIFIED TYPE: Primary | ICD-10-CM

## 2024-02-18 DIAGNOSIS — R07.89 ATYPICAL CHEST PAIN: ICD-10-CM

## 2024-02-18 DIAGNOSIS — R51.9 NONINTRACTABLE HEADACHE, UNSPECIFIED CHRONICITY PATTERN, UNSPECIFIED HEADACHE TYPE: ICD-10-CM

## 2024-02-18 LAB
ALBUMIN SERPL-MCNC: 3.9 G/DL (ref 3.5–5.2)
ALBUMIN/GLOB SERPL: 1.3 G/DL
ALP SERPL-CCNC: 81 U/L (ref 39–117)
ALT SERPL W P-5'-P-CCNC: 22 U/L (ref 1–41)
ANION GAP SERPL CALCULATED.3IONS-SCNC: 12.5 MMOL/L (ref 5–15)
AST SERPL-CCNC: 19 U/L (ref 1–40)
BASOPHILS # BLD AUTO: 0.05 10*3/MM3 (ref 0–0.2)
BASOPHILS NFR BLD AUTO: 0.4 % (ref 0–1.5)
BILIRUB SERPL-MCNC: 0.3 MG/DL (ref 0–1.2)
BUN SERPL-MCNC: 12 MG/DL (ref 6–20)
BUN/CREAT SERPL: 16 (ref 7–25)
CALCIUM SPEC-SCNC: 9.2 MG/DL (ref 8.6–10.5)
CHLORIDE SERPL-SCNC: 98 MMOL/L (ref 98–107)
CO2 SERPL-SCNC: 25.5 MMOL/L (ref 22–29)
CREAT SERPL-MCNC: 0.75 MG/DL (ref 0.76–1.27)
D DIMER PPP FEU-MCNC: <0.27 MCGFEU/ML (ref 0–0.5)
DEPRECATED RDW RBC AUTO: 43.3 FL (ref 37–54)
EGFRCR SERPLBLD CKD-EPI 2021: 112.7 ML/MIN/1.73
EOSINOPHIL # BLD AUTO: 0.2 10*3/MM3 (ref 0–0.4)
EOSINOPHIL NFR BLD AUTO: 1.6 % (ref 0.3–6.2)
ERYTHROCYTE [DISTWIDTH] IN BLOOD BY AUTOMATED COUNT: 16.2 % (ref 12.3–15.4)
GLOBULIN UR ELPH-MCNC: 3.1 GM/DL
GLUCOSE SERPL-MCNC: 157 MG/DL (ref 65–99)
HCT VFR BLD AUTO: 51.5 % (ref 37.5–51)
HGB BLD-MCNC: 16.7 G/DL (ref 13–17.7)
IMM GRANULOCYTES # BLD AUTO: 0.06 10*3/MM3 (ref 0–0.05)
IMM GRANULOCYTES NFR BLD AUTO: 0.5 % (ref 0–0.5)
LYMPHOCYTES # BLD AUTO: 2.03 10*3/MM3 (ref 0.7–3.1)
LYMPHOCYTES NFR BLD AUTO: 16.6 % (ref 19.6–45.3)
MCH RBC QN AUTO: 25.9 PG (ref 26.6–33)
MCHC RBC AUTO-ENTMCNC: 32.4 G/DL (ref 31.5–35.7)
MCV RBC AUTO: 80 FL (ref 79–97)
MONOCYTES # BLD AUTO: 0.68 10*3/MM3 (ref 0.1–0.9)
MONOCYTES NFR BLD AUTO: 5.6 % (ref 5–12)
NEUTROPHILS NFR BLD AUTO: 75.3 % (ref 42.7–76)
NEUTROPHILS NFR BLD AUTO: 9.23 10*3/MM3 (ref 1.7–7)
NRBC BLD AUTO-RTO: 0 /100 WBC (ref 0–0.2)
NT-PROBNP SERPL-MCNC: 85.9 PG/ML (ref 0–450)
PLATELET # BLD AUTO: 292 10*3/MM3 (ref 140–450)
PMV BLD AUTO: 10.5 FL (ref 6–12)
POTASSIUM SERPL-SCNC: 4.1 MMOL/L (ref 3.5–5.2)
PROT SERPL-MCNC: 7 G/DL (ref 6–8.5)
RBC # BLD AUTO: 6.44 10*6/MM3 (ref 4.14–5.8)
SODIUM SERPL-SCNC: 136 MMOL/L (ref 136–145)
TROPONIN T SERPL HS-MCNC: 8 NG/L
WBC NRBC COR # BLD AUTO: 12.25 10*3/MM3 (ref 3.4–10.8)

## 2024-02-18 PROCEDURE — 85025 COMPLETE CBC W/AUTO DIFF WBC: CPT | Performed by: EMERGENCY MEDICINE

## 2024-02-18 PROCEDURE — 80053 COMPREHEN METABOLIC PANEL: CPT | Performed by: EMERGENCY MEDICINE

## 2024-02-18 PROCEDURE — 99283 EMERGENCY DEPT VISIT LOW MDM: CPT

## 2024-02-18 PROCEDURE — 96376 TX/PRO/DX INJ SAME DRUG ADON: CPT

## 2024-02-18 PROCEDURE — 93010 ELECTROCARDIOGRAM REPORT: CPT | Performed by: INTERNAL MEDICINE

## 2024-02-18 PROCEDURE — 83880 ASSAY OF NATRIURETIC PEPTIDE: CPT | Performed by: EMERGENCY MEDICINE

## 2024-02-18 PROCEDURE — 36415 COLL VENOUS BLD VENIPUNCTURE: CPT

## 2024-02-18 PROCEDURE — 84484 ASSAY OF TROPONIN QUANT: CPT | Performed by: EMERGENCY MEDICINE

## 2024-02-18 PROCEDURE — 93005 ELECTROCARDIOGRAM TRACING: CPT | Performed by: EMERGENCY MEDICINE

## 2024-02-18 PROCEDURE — 85379 FIBRIN DEGRADATION QUANT: CPT | Performed by: EMERGENCY MEDICINE

## 2024-02-18 PROCEDURE — 96374 THER/PROPH/DIAG INJ IV PUSH: CPT

## 2024-02-18 PROCEDURE — 25010000002 LABETALOL 5 MG/ML SOLUTION: Performed by: EMERGENCY MEDICINE

## 2024-02-18 RX ORDER — LABETALOL HYDROCHLORIDE 5 MG/ML
10 INJECTION, SOLUTION INTRAVENOUS ONCE
Status: COMPLETED | OUTPATIENT
Start: 2024-02-18 | End: 2024-02-18

## 2024-02-18 RX ORDER — LABETALOL HYDROCHLORIDE 5 MG/ML
10 INJECTION, SOLUTION INTRAVENOUS ONCE
Status: DISCONTINUED | OUTPATIENT
Start: 2024-02-18 | End: 2024-02-19 | Stop reason: HOSPADM

## 2024-02-18 RX ORDER — ACETAMINOPHEN 500 MG
1000 TABLET ORAL ONCE
Status: COMPLETED | OUTPATIENT
Start: 2024-02-18 | End: 2024-02-18

## 2024-02-18 RX ADMIN — ACETAMINOPHEN 1000 MG: 500 TABLET ORAL at 23:23

## 2024-02-18 RX ADMIN — LABETALOL HYDROCHLORIDE 10 MG: 5 INJECTION, SOLUTION INTRAVENOUS at 22:36

## 2024-02-18 RX ADMIN — LABETALOL HYDROCHLORIDE 10 MG: 5 INJECTION, SOLUTION INTRAVENOUS at 21:22

## 2024-02-19 LAB
GEN 5 2HR TROPONIN T REFLEX: 9 NG/L
QT INTERVAL: 374 MS
QTC INTERVAL: 428 MS
TROPONIN T DELTA: 1 NG/L

## 2024-02-19 NOTE — DISCHARGE INSTRUCTIONS
Hold your restless leg medication take your blood pressure couple times a day and write down the time of value that you obtain.  Please with you to your primary care provider's office.  Follow-up with your primary care provider on Tuesday.  The emergency department if there is worsening pain, headache, blood pressure consistently staying 180/100 worse in any way at all.

## 2024-02-19 NOTE — ED NOTES
Pt states he has claustrophobia and does not want to get the CT ordered by Dr. Love. Pt education done on reason for CT order but pt still refuses. Provider made aware.

## 2024-02-19 NOTE — ED PROVIDER NOTES
Subjective   History of Present Illness    Chief complaint: Elevated blood pressure    Location: Home    Quality/Severity: 209/115    Timing/Onset/Duration: Noted today    Modifying Factors: Not improved with taking his blood pressure medicines.  Symptoms started after he started his medicines for restless leg.    Associated Symptoms: Patient complains of a headache.  No fever chills or cough.  No sore throat earache or nasal congestion.  Patient denies chest pain.  No abdominal pain.  No diarrhea or burning when he urinates.  No numbness, tingling, weakness, or change in bladder or bowel function.    Narrative: This 48-year-old presents with elevated blood pressure all day.  The patient states that he is taking his blood pressure medicine today.    PCP:Rula Russell APRN      Review of Systems      Past Medical History:   Diagnosis Date   • Diabetes mellitus    • Fracture of C4 vertebra, open    • GERD (gastroesophageal reflux disease)    • Hyperlipidemia    • Hypertension    • Sleep apnea        Allergies   Allergen Reactions   • Sulfa Antibiotics Hives       Past Surgical History:   Procedure Laterality Date   • COLONOSCOPY N/A 9/17/2019    Procedure: Colonoscopy,polypectomy;  Surgeon: Deborah Gamez DO;  Location: [facility] OR;  Service: Gastroenterology   • ENDOSCOPY N/A 9/17/2019    Procedure: Esophagogastroduodenoscoy, biopsy  ,polypectomy;  Surgeon: Deborah Gamez DO;  Location: [facility] OR;  Service: Gastroenterology   • GANGLION CYST EXCISION     • UMBILICAL HERNIA REPAIR         Family History   Problem Relation Age of Onset   • Diabetes Father    • Heart disease Father    • Hypertension Father        Social History     Socioeconomic History   • Marital status:    Tobacco Use   • Smoking status: Never   • Smokeless tobacco: Never   Substance and Sexual Activity   • Alcohol use: No   • Drug use: No   • Sexual activity: Defer           Objective   Physical Exam  Vitals (The  temperature is 98 °F, pulse 86, respirations 18, /115, room air pulse ox 94%.) and nursing note reviewed.       Procedures           ED Course  ED Course as of 02/19/24 0013   Sun Feb 18, 2024 2236 proBNP is 85.9 and normal. [RC]   2236 The high-sensitivity troponin is 8 normal. [RC]   2237 The glucose is 157, the CMP is otherwise unremarkable. [RC]   2237 The D-dimer is less than 0.27 and normal. [RC]   2237 Blood cell count is 12.2, hematocrit 51, platelet count 292,000, absolute neutrophil count 9.2, CBC is otherwise unremarkable. [RC]   Mon Feb 19, 2024 0013 The 2-hour troponin is 9 with a delta of 1. [RC]      ED Course User Index  [RC] Adam Love MD      21:29 EST, 02/18/24:  The patient is refusing the CT head and CT angiogram the chest.  He understands that he may suffer death or permanent disability from stroke, intracerebral hemorrhage, or thoracic dissection.  He was offered something to help him with his anxiety and claustrophobia but he declined.  He states he is unable to do MRIs or CAT scans.                             21:52 EST, 02/18/24:  The EKG was obtained at 1144 and read by me at 2146.  EKG shows a normal sinus rhythm with a rate of 78.  There is a normal axis with no hypertrophy.  The MD, QRS, QT intervals are unremarkable.  There is abnormal inferior Q waves.  No ectopy.  There is no acute ST elevation or depression.  The EKG tonight this is also unchanged when compared to EKG dated 3/23/2021.        22:38 EST, 02/18/24:  The patient's blood pressure was rechecked and is 163/104.  He still has a headache but his chest pain is resolved.  The patient has been advised to stop his restless leg medication.    23:07 EST, 02/18/24:  Repeat blood pressure is 163/100.    23:10 EST, 02/18/24:  The patient's diagnosis of hypertension was discussed with him.  Patient should stop his restless leg medication.  The patient should monitor his blood pressure, checking it twice a day and  write down the time of values that he obtains.  He should take these values to his primary care provider's office.  Patient should follow-up with his primary care provider in 2 days.  The patient should return to the emergency department if his blood pressure consistently is above 180/100, worsening headache, chest pain, worse in any way at all.    23:51 EST, 02/18/24:  The repeat blood pressure is 149/68.    00:10 EST, 02/19/24:  The case was discussed with Dr. Mendoza and turned over to him.  00 10.  The second troponin is pending.  This is negative the patient will be discharged home with the discharge instructions above.  Medical Decision Making      Final diagnoses:   Hypertension, unspecified type   Atypical chest pain   Nonintractable headache, unspecified chronicity pattern, unspecified headache type       ED Disposition  ED Disposition       None            No follow-up provider specified.       Medication List      No changes were made to your prescriptions during this visit.            Adam Love MD  02/18/24 [id number]       Adam Love MD  02/19/24 001

## 2024-02-20 ENCOUNTER — TELEPHONE (OUTPATIENT)
Dept: SURGERY | Facility: CLINIC | Age: 49
End: 2024-02-20
Payer: MEDICAID

## (undated) DEVICE — GOWN ISOL W/THUMB UNIV BLU BX/15

## (undated) DEVICE — LAB CORP AGAR SLANT UREA PK/10

## (undated) DEVICE — BW-412T DISP COMBO CLEANING BRUSH: Brand: SINGLE USE COMBINATION CLEANING BRUSH

## (undated) DEVICE — Device: Brand: DEFENDO AIR/WATER/SUCTION AND BIOPSY VALVE

## (undated) DEVICE — PAD GRND E/S W/CORD SPLT A/

## (undated) DEVICE — THE BITE BLOCK MAXI, LATEX FREE STRAP IS USED TO PROTECT THE ENDOSCOPE INSERTION TUBE FROM BEING BITTEN BY THE PATIENT.

## (undated) DEVICE — SYR LL 3CC

## (undated) DEVICE — VIAL FORMALIN CAP 10P 40ML

## (undated) DEVICE — TRAP POLYP 4CHAMBER

## (undated) DEVICE — SPNG GZ WOVN 4X4IN 12PLY 10/BX STRL

## (undated) DEVICE — FRCP BX RADJAW4 NDL 2.8 240CM LG OG BX40

## (undated) DEVICE — SUCTION CANISTER, 3000CC,SAFELINER: Brand: DEROYAL

## (undated) DEVICE — MASK,FACE,FLUID RESIST,SHLD,EARLOOP: Brand: MEDLINE

## (undated) DEVICE — SNAR POLYP CAPTIFLX WD OVL 27MM 240CM

## (undated) DEVICE — Device